# Patient Record
Sex: FEMALE | Race: BLACK OR AFRICAN AMERICAN
[De-identification: names, ages, dates, MRNs, and addresses within clinical notes are randomized per-mention and may not be internally consistent; named-entity substitution may affect disease eponyms.]

---

## 2020-07-04 ENCOUNTER — HOSPITAL ENCOUNTER (EMERGENCY)
Dept: HOSPITAL 61 - ER | Age: 35
Discharge: HOME | End: 2020-07-04
Payer: MEDICARE

## 2020-07-04 VITALS — WEIGHT: 114.64 LBS | HEIGHT: 64 IN | BODY MASS INDEX: 19.57 KG/M2

## 2020-07-04 VITALS — SYSTOLIC BLOOD PRESSURE: 97 MMHG | DIASTOLIC BLOOD PRESSURE: 53 MMHG

## 2020-07-04 DIAGNOSIS — D57.00: Primary | ICD-10-CM

## 2020-07-04 DIAGNOSIS — M79.605: ICD-10-CM

## 2020-07-04 DIAGNOSIS — M79.604: ICD-10-CM

## 2020-07-04 DIAGNOSIS — R07.89: ICD-10-CM

## 2020-07-04 LAB
ALBUMIN SERPL-MCNC: 4.1 G/DL (ref 3.4–5)
ALBUMIN/GLOB SERPL: 1.2 {RATIO} (ref 1–1.7)
ALP SERPL-CCNC: 143 U/L (ref 46–116)
ALT SERPL-CCNC: 50 U/L (ref 14–59)
ANION GAP SERPL CALC-SCNC: 11 MMOL/L (ref 6–14)
ANISOCYTOSIS BLD QL SMEAR: (no result)
AST SERPL-CCNC: 61 U/L (ref 15–37)
BASOPHILS # BLD AUTO: 0.2 X10^3/UL (ref 0–0.2)
BASOPHILS NFR BLD: 2 % (ref 0–3)
BILIRUB SERPL-MCNC: 1 MG/DL (ref 0.2–1)
BUN SERPL-MCNC: 13 MG/DL (ref 7–20)
BUN/CREAT SERPL: 14 (ref 6–20)
CALCIUM SERPL-MCNC: 8.3 MG/DL (ref 8.5–10.1)
CHLORIDE SERPL-SCNC: 106 MMOL/L (ref 98–107)
CO2 SERPL-SCNC: 21 MMOL/L (ref 21–32)
CREAT SERPL-MCNC: 0.9 MG/DL (ref 0.6–1)
EOSINOPHIL NFR BLD: 0.3 X10^3/UL (ref 0–0.7)
EOSINOPHIL NFR BLD: 4 % (ref 0–3)
ERYTHROCYTE [DISTWIDTH] IN BLOOD BY AUTOMATED COUNT: 20.8 % (ref 11.5–14.5)
GFR SERPLBLD BASED ON 1.73 SQ M-ARVRAT: 86.2 ML/MIN
GLOBULIN SER-MCNC: 3.5 G/DL (ref 2.2–3.8)
GLUCOSE SERPL-MCNC: 99 MG/DL (ref 70–99)
HCT VFR BLD CALC: 24.2 % (ref 36–47)
HGB BLD-MCNC: 8.5 G/DL (ref 12–15.5)
HGB S BLD QL SOLY: (no result)
HOWELL-JOLLY BOD BLD QL SMEAR: PRESENT
LDH SERPL L TO P-CCNC: 583 U/L (ref 81–234)
LIPASE: 109 U/L (ref 73–393)
LYMPHOCYTES # BLD: 1.4 X10^3/UL (ref 1–4.8)
LYMPHOCYTES NFR BLD AUTO: 17 % (ref 24–48)
MCH RBC QN AUTO: 37 PG (ref 25–35)
MCHC RBC AUTO-ENTMCNC: 35 G/DL (ref 31–37)
MCV RBC AUTO: 104 FL (ref 79–100)
MONO #: 1.2 X10^3/UL (ref 0–1.1)
MONOCYTES NFR BLD: 15 % (ref 0–9)
NEUT #: 5 X10^3/UL (ref 1.8–7.7)
NEUTROPHILS NFR BLD AUTO: 62 % (ref 31–73)
PAPPENHEIMER BODIES: PRESENT
PLATELET # BLD AUTO: 517 X10^3/UL (ref 140–400)
PLATELET # BLD EST: (no result) 10*3/UL
POLYCHROMASIA BLD QL SMEAR: SLIGHT
POTASSIUM SERPL-SCNC: 4.1 MMOL/L (ref 3.5–5.1)
PROT SERPL-MCNC: 7.6 G/DL (ref 6.4–8.2)
PROTHROMBIN TIME: 14.4 SEC (ref 11.7–14)
RBC # BLD AUTO: 2.33 X10^6/UL (ref 3.5–5.4)
SODIUM SERPL-SCNC: 138 MMOL/L (ref 136–145)
TARGETS BLD QL SMEAR: (no result)
WBC # BLD AUTO: 8.1 X10^3/UL (ref 4–11)

## 2020-07-04 PROCEDURE — 85045 AUTOMATED RETICULOCYTE COUNT: CPT

## 2020-07-04 PROCEDURE — 83690 ASSAY OF LIPASE: CPT

## 2020-07-04 PROCEDURE — 96374 THER/PROPH/DIAG INJ IV PUSH: CPT

## 2020-07-04 PROCEDURE — 71046 X-RAY EXAM CHEST 2 VIEWS: CPT

## 2020-07-04 PROCEDURE — 83605 ASSAY OF LACTIC ACID: CPT

## 2020-07-04 PROCEDURE — 96375 TX/PRO/DX INJ NEW DRUG ADDON: CPT

## 2020-07-04 PROCEDURE — 99285 EMERGENCY DEPT VISIT HI MDM: CPT

## 2020-07-04 PROCEDURE — 80053 COMPREHEN METABOLIC PANEL: CPT

## 2020-07-04 PROCEDURE — 36415 COLL VENOUS BLD VENIPUNCTURE: CPT

## 2020-07-04 PROCEDURE — 85025 COMPLETE CBC W/AUTO DIFF WBC: CPT

## 2020-07-04 PROCEDURE — 85610 PROTHROMBIN TIME: CPT

## 2020-07-04 PROCEDURE — 83615 LACTATE (LD) (LDH) ENZYME: CPT

## 2020-07-04 PROCEDURE — 96376 TX/PRO/DX INJ SAME DRUG ADON: CPT

## 2020-07-04 NOTE — RAD
Exam: Chest 2 views

 

INDICATION: Chest pain

 

TECHNIQUE: Frontal and lateral views the chest

 

Comparisons: None

 

FINDINGS:

The cardiomediastinal silhouette and pulmonary vessels are within normal 

limits.

 

The lung and pleural spaces are clear.

 

IMPRESSION:

No acute cardiopulmonary process.

 

Electronically signed by: Tracey Lind MD (7/4/2020 10:25 PM) [id number]

## 2020-07-04 NOTE — PHYS DOC
Past Medical History


Past Medical History:  Sickle Cell Disease


Additional Past Medical Histor:  stroke x 2


Past Surgical History:  Hip Replacement


Additional Past Surgical Histo:  brain shunt, hip replacement x 2


Smoking Status:  Never Smoker


Alcohol Use:  None





General Adult


EDM:


Chief Complaint:  PAIN CONTROL





HPI:


HPI:





Patient is a 35  year old female who presents to the ED with a chief complaint 

of sickle cell pain.  Patient states that the pain started earlier today.  

Patient states that the pain is in her chest, bilateral lower extremities.  

Patient states that she takes hydrocodone for her pain and is seen at the sickle

cell clinic in [place].  Patient denies fever, chills, nausea, vomiting, 

diarrhea, shortness of breath.  Patient denies exposure to anyone with c

oronavirus.





Review of Systems:


Review of Systems:


Constitutional:   Denies fever or chills. []


Eyes:   Denies change in visual acuity. []


HENT:   Denies nasal congestion or sore throat. [] 


Respiratory:   Complains of chest pain [] 


Cardiovascular:   Denies chest pain or edema. [] 


GI:   Denies abdominal pain, nausea, vomiting, bloody stools or diarrhea. [] 


:  Denies dysuria. [] 


Musculoskeletal:   Complains of bilateral lower extremity pain


Neurologic:   Denies headache, focal weakness or sensory changes. []





Heart Score:


Risk Factors:


Risk Factors:  DM, Current or recent (<one month) smoker, HTN, HLP, family 

history of CAD, obesity.


Risk Scores:


Score 0 - 3:  2.5% MACE over next 6 weeks - Discharge Home


Score 4 - 6:  20.3% MACE over next 6 weeks - Admit for Clinical Observation


Score 7 - 10:  72.7% MACE over next 6 weeks - Early Invasive Strategies





Current Medications:





Current Medications








 Medications


  (Trade)  Dose


 Ordered  Sig/Caren  Start Time


 Stop Time Status Last Admin


Dose Admin


 


 Diphenhydramine


 HCl


  (Benadryl)  25 mg  1X  ONCE  7/4/20 21:00


 7/4/20 21:01 DC 7/4/20 20:54


25 MG


 


 Morphine Sulfate


  (Morphine


 Sulfate)  4 mg  1X  ONCE  7/4/20 22:00


 7/4/20 22:01 DC 7/4/20 21:43


4 MG


 


 Ondansetron HCl


  (Zofran)  4 mg  1X  ONCE  7/4/20 20:30


 7/4/20 20:35 DC 7/4/20 20:42


4 MG


 


 Sodium Chloride  1,000 ml @ 


 1,000 mls/hr  1X  ONCE  7/4/20 20:30


 7/4/20 21:29 DC 7/4/20 20:42


1,000 MLS/HR











Allergies:


Allergies:





Allergies








Coded Allergies Type Severity Reaction Last Updated Verified


 


  No Known Drug Allergies    7/4/20 No











Physical Exam:


PE:





Constitutional: Well developed, well nourished, no acute distress, non-toxic 

appearance. []


HENT: Normocephalic, atraumatic


Eyes: EOMI


Neck: Normal range of motion, Supple


Cardiovascular:Heart rate regular rhythm


Lungs & Thorax:  Bilateral breath sounds clear to auscultation []


Abdomen: Bowel sounds normal, soft, no tenderness


Extremities: No tenderness, ROM intact


Neurologic: Alert and oriented X 3





Current Patient Data:


Labs:





                                Laboratory Tests








Test


 7/4/20


20:17 7/4/20


21:10


 


White Blood Count


 8.1 x10^3/uL


(4.0-11.0) 





 


Red Blood Count


 2.34 x10^6/uL


(3.50-5.70)  L 





 


Hemoglobin


 8.5 g/dL


(12.0-15.5)  L 





 


Hematocrit


 24.2 %


(36.0-47.0)  L 





 


Mean Corpuscular Volume


 104 fL


()  H 





 


Mean Corpuscular Hemoglobin


 37 pg (25-35)


H 





 


Mean Corpuscular Hemoglobin


Concent 35 g/dL


(31-37) 





 


Red Cell Distribution Width


 20.8 %


(11.5-14.5)  H 





 


Platelet Count


 517 x10^3/uL


(140-400)  H 





 


Neutrophils (%) (Auto) 62 % (31-73)   


 


Lymphocytes (%) (Auto) 17 % (24-48)  L 


 


Monocytes (%) (Auto) 15 % (0-9)  H 


 


Eosinophils (%) (Auto) 4 % (0-3)  H 


 


Basophils (%) (Auto) 2 % (0-3)   


 


Neutrophils # (Auto)


 5.0 x10^3/uL


(1.8-7.7) 





 


Lymphocytes # (Auto)


 1.4 x10^3/uL


(1.0-4.8) 





 


Monocytes # (Auto)


 1.2 x10^3/uL


(0.0-1.1)  H 





 


Eosinophils # (Auto)


 0.3 x10^3/uL


(0.0-0.7) 





 


Basophils # (Auto)


 0.2 x10^3/uL


(0.0-0.2) 





 


Platelet Estimate


 Increased


(ADEQUATE) 





 


Polychromasia Slight   


 


Anisocytosis Mod   


 


Pappenheimer Bodies Present   


 


Sickle Cells Few   


 


Target Cells Few   


 


Mendoza-Jolly Bodies Present   


 


Absolute Reticulocyte Count


 0.078 x10^6/uL


(0.020-0.120) 





 


Percent Reticulocyte Count


 3.3 %


(0.5-2.3)  H 





 


Immature Reticulocyte Fraction


 0.71


(0.20-0.60)  H 





 


Prothrombin Time


 14.4 SEC


(11.7-14.0)  H 





 


Prothrombin Time INR


 1.2 (0.8-1.1)


H 





 


Sodium Level


 


 138 mmol/L


(136-145)


 


Potassium Level


 


 4.1 mmol/L


(3.5-5.1)


 


Chloride Level


 


 106 mmol/L


()


 


Carbon Dioxide Level


 


 21 mmol/L


(21-32)


 


Anion Gap  11 (6-14)  


 


Blood Urea Nitrogen


 


 13 mg/dL


(7-20)


 


Creatinine


 


 0.9 mg/dL


(0.6-1.0)


 


Estimated GFR


(Cockcroft-Gault) 


 86.2  





 


BUN/Creatinine Ratio  14 (6-20)  


 


Glucose Level


 


 99 mg/dL


(70-99)


 


Lactic Acid Level


 


 0.9 mmol/L


(0.4-2.0)


 


Calcium Level


 


 8.3 mg/dL


(8.5-10.1)  L


 


Total Bilirubin


 


 1.0 mg/dL


(0.2-1.0)


 


Aspartate Amino Transferase


(AST) 


 61 U/L (15-37)


H


 


Alanine Aminotransferase (ALT)


 


 50 U/L (14-59)





 


Alkaline Phosphatase


 


 143 U/L


()  H


 


Lactate Dehydrogenase


 


 583 U/L


()  H


 


Total Protein


 


 7.6 g/dL


(6.4-8.2)


 


Albumin


 


 4.1 g/dL


(3.4-5.0)


 


Albumin/Globulin Ratio  1.2 (1.0-1.7)  


 


Lipase


 


 109 U/L


()





                                Laboratory Tests


7/4/20 20:17








                                Laboratory Tests


7/4/20 21:10








Vital Signs:





                                   Vital Signs








  Date Time  Temp Pulse Resp B/P (MAP) Pulse Ox O2 Delivery O2 Flow Rate FiO2


 


7/4/20 21:43   16  99 Room Air  


 


7/4/20 21:30  88      


 


7/4/20 19:50 98.7   108/67 (81)    





 98.7       











EKG:


EKG:


[]





Radiology/Procedures:


Radiology/Procedures:


[]


Impression:


CXR 


IMPRESSION:


No acute cardiopulmonary process.





Course & Med Decision Making:


Course & Med Decision Making


Pertinent Labs and Imaging studies reviewed. (See chart for details)





Labs show that patient has chronic anemia.





Chest x-ray does not show any acute injury or thoracic process.





Reticulocyte count is not elevated.





Patient is received 2 doses of morphine 4 mg IV.





On recheck patient states that her pain is controlled.





Patient refused EKG.


Patient also does not want troponin to be drawn.





Patient was to be discharged home for outpatient follow-up.





Discussed  results and plan of care with patient.


Patient is instructed to follow up with PCP in one to 2 days.


Appropriate discharge instructions given to patient to return to the ED or to 

seek immediate medical evaluation.


Patient is instructed to return to the ED if symptoms worsen or if any concerns.





Dragon Disclaimer:


Dragon Disclaimer:


This electronic medical record was generated, in whole or in part, using a voice

 recognition dictation system.





Departure


Departure


Impression:  


   Primary Impression:  


   Sickle cell anemia with pain


Disposition:  01 HOME, SELF-CARE


Condition:  STABLE


Referrals:  


NO PCP (PCP)


Patient Instructions:  Sickle Cell Anemia, Sickle Cell Pain Crisis





Additional Instructions:  


Discussed  results and plan of care with patient.


Patient is instructed to follow up with PCP in one to 2 days.


Appropriate discharge instructions given to patient to return to the ED or to 

seek immediate medical evaluation.


Patient is instructed to return to the ED if symptoms worsen or if any concerns.





Justicifation of Admission Dx:


Justifications for Admission:


Justification of Admission Dx:  LETITIA Foreman DO            Jul 4, 2020 23:04

## 2020-07-05 ENCOUNTER — HOSPITAL ENCOUNTER (EMERGENCY)
Dept: HOSPITAL 61 - ER | Age: 35
Discharge: HOME | End: 2020-07-05
Payer: MEDICARE

## 2020-07-05 VITALS — WEIGHT: 121.25 LBS | BODY MASS INDEX: 20.7 KG/M2 | HEIGHT: 64 IN

## 2020-07-05 VITALS — SYSTOLIC BLOOD PRESSURE: 98 MMHG | DIASTOLIC BLOOD PRESSURE: 53 MMHG

## 2020-07-05 DIAGNOSIS — M25.552: ICD-10-CM

## 2020-07-05 DIAGNOSIS — D57.1: Primary | ICD-10-CM

## 2020-07-05 DIAGNOSIS — M25.551: ICD-10-CM

## 2020-07-05 DIAGNOSIS — R07.89: ICD-10-CM

## 2020-07-05 LAB
ANISOCYTOSIS BLD QL SMEAR: PRESENT
BASOPHILS # BLD AUTO: 0.1 X10^3/UL (ref 0–0.2)
BASOPHILS NFR BLD: 2 % (ref 0–3)
EOSINOPHIL NFR BLD: 0.2 X10^3/UL (ref 0–0.7)
EOSINOPHIL NFR BLD: 3 % (ref 0–3)
ERYTHROCYTE [DISTWIDTH] IN BLOOD BY AUTOMATED COUNT: 20.7 % (ref 11.5–14.5)
HCT VFR BLD CALC: 22.9 % (ref 36–47)
HGB BLD-MCNC: 7.9 G/DL (ref 12–15.5)
HGB S BLD QL SOLY: PRESENT
HOWELL-JOLLY BOD BLD QL SMEAR: PRESENT
HYPOCHROMIA BLD QL SMEAR: SLIGHT
LYMPHOCYTES # BLD: 1.3 X10^3/UL (ref 1–4.8)
LYMPHOCYTES NFR BLD AUTO: 18 % (ref 24–48)
MACROCYTES BLD QL SMEAR: PRESENT
MCH RBC QN AUTO: 36 PG (ref 25–35)
MCHC RBC AUTO-ENTMCNC: 34 G/DL (ref 31–37)
MCV RBC AUTO: 105 FL (ref 79–100)
MONO #: 0.9 X10^3/UL (ref 0–1.1)
MONOCYTES NFR BLD: 13 % (ref 0–9)
NEUT #: 4.7 X10^3/UL (ref 1.8–7.7)
NEUTROPHILS NFR BLD AUTO: 64 % (ref 31–73)
PAPPENHEIMER BODIES: PRESENT
PLATELET # BLD AUTO: 459 X10^3/UL (ref 140–400)
PLATELET # BLD EST: (no result) 10*3/UL
POIKILOCYTOSIS BLD QL SMEAR: PRESENT
RBC # BLD AUTO: 2.19 X10^6/UL (ref 3.5–5.4)
TARGETS BLD QL SMEAR: PRESENT
WBC # BLD AUTO: 7.3 X10^3/UL (ref 4–11)

## 2020-07-05 PROCEDURE — 85045 AUTOMATED RETICULOCYTE COUNT: CPT

## 2020-07-05 PROCEDURE — 36415 COLL VENOUS BLD VENIPUNCTURE: CPT

## 2020-07-05 PROCEDURE — 93005 ELECTROCARDIOGRAM TRACING: CPT

## 2020-07-05 PROCEDURE — 85025 COMPLETE CBC W/AUTO DIFF WBC: CPT

## 2020-07-05 PROCEDURE — 96376 TX/PRO/DX INJ SAME DRUG ADON: CPT

## 2020-07-05 PROCEDURE — 96374 THER/PROPH/DIAG INJ IV PUSH: CPT

## 2020-07-05 PROCEDURE — 99284 EMERGENCY DEPT VISIT MOD MDM: CPT

## 2020-07-05 NOTE — PHYS DOC
Past Medical History


Past Medical History:  Sickle Cell Disease


Additional Past Medical Histor:  stroke x 2


Past Surgical History:  Hip Replacement


Additional Past Surgical Histo:  brain shunt, hip replacement x 2


Smoking Status:  Never Smoker


Alcohol Use:  Occasionally





General Adult


EDM:


Chief Complaint:  PAIN CONTROL





HPI:


HPI:





Patient is a 35  year old female who presents with sickle cell pain crisis after

the last couple of days.  Patient was seen in the emergency room last night for 

the same pain.  Pain improved after treatment in the emergency room.  She sat in

the waiting room for several hours waiting for a cab but never came.  She states

that because of her sickle cell pain she gets very stiff and painful when she 

has to sit for long periods of time and gets cold.  She has been taking her 

medications as prescribed.  She denies any new symptoms.  She did turn down an 

EKG last night but states that she is happy to get one today.





Review of Systems:


Review of Systems:


General: Denies fever, chills, sweats, fatigue


Eyes: Denies drainage, blurred vision, eye redness


HENT: Denies rhinorrhea, sore throat, earache


Respiratory: Denies cough, shortness of breath, wheezing


Cardiac: Denies edema, palpitations.  Reports chest pain


GI: Denies abdominal pain, Nausea, vomiting


MSK: Denies back pain, neck pain reports hip pain


Skin: Denies rash, jaundice


Neuro: Denies headache, dizziness


Psychiatric: Denies SI/HI





Heart Score:


Risk Factors:


Risk Factors:  DM, Current or recent (<one month) smoker, HTN, HLP, family 

history of CAD, obesity.


Risk Scores:


Score 0 - 3:  2.5% MACE over next 6 weeks - Discharge Home


Score 4 - 6:  20.3% MACE over next 6 weeks - Admit for Clinical Observation


Score 7 - 10:  72.7% MACE over next 6 weeks - Early Invasive Strategies





Current Medications:





Current Medications








 Medications


  (Trade)  Dose


 Ordered  Sig/Caren  Start Time


 Stop Time Status Last Admin


Dose Admin


 


 Morphine Sulfate


  (Morphine


 Sulfate)  5 mg  1X  ONCE  7/5/20 08:45


 7/5/20 08:46 DC  





 


 Sodium Chloride  1,000 ml @ 


 40 mls/hr  1X  ONCE  7/5/20 08:30


 7/6/20 09:29   














Allergies:


Allergies:





Allergies








Coded Allergies Type Severity Reaction Last Updated Verified


 


  No Known Drug Allergies    7/4/20 No











Physical Exam:


PE:


General: Awake, alert, NAD. Well Nourished, well hydrated. Cooperative


HEENT: Atraumatic, EOMI, PERRL, airway patent, moist oral mucosa


Neck: Supple, trachea midline


Respiratory: CTA bilaterally, normal effort, no wheezing/crackles


CV: RRR, no murmur, cap refill <2


GI: Soft, nondistended, nontender, no masses


MSK: No obvious deformities


Skin: Warm, dry, intact


Neuro: A&O x3, speech NL, sensory and motor grossly intact, no focal deficits


Psych: Normal affect, normal mood, not suicidal or homicidal





Current Patient Data:


Vital Signs:





                                   Vital Signs








  Date Time  Temp Pulse Resp B/P (MAP) Pulse Ox O2 Delivery O2 Flow Rate FiO2


 


7/5/20 08:07 97.5 91 18 115/56 (75) 98 Room Air  





 97.5       











EKG:


EKG:


[]





Radiology/Procedures:


Radiology/Procedures:


[]





Course & Med Decision Making:


Course & Med Decision Making


Pertinent Labs and Imaging studies reviewed. (See chart for details)





Patient is a 35-year-old female with a past medical history of sickle cell who 

presents to the Emergency Room complaining of sickle cell pain consistent with a

 sickle cell crisis. On exam, patient is well-appearing. Patient is having pain 

in her hips and chest. Patient does have chest pain and will need a chest x-ray 

and EKG. Patient does not have priapism, focal neurologic deficits, fever, 

hypoxia, hypotension. CBC, retic count, CMP, UA were ordered to evaluate for 

aplastic crisis and precipitating factors of a crisis including infection, 

acidosis, dehydration. Patient was given morphine and fluids for symptoms upon 

arrival. Patient has had 2 emergency Room visits for sickle cell crisis in the 

last year. Work up was reviewed and reveals stable labs.  Hemoglobin is slightly

 lower but this is likely due to the fluid she received last night and this 

morning. On reevaluation, pain is better.  Patient is requesting IV Benadryl and

 multiple doses of pain medication.  I have discussed with her that I will give 

her oral Benadryl and 1 more dose of pain medicine.  Patient does not appear to 

be in any acute distress.  Patient's test results and vitals while in the ED 

were fully reviewed and discussed with the patient. Patient is stable and at 

this time does not need admission to the hospital. We have discussed strict 

return precautions and the importance of following up with their Primary Care 

Physician. Patient stated understanding and was given an opportunity to ask any 

questions. Patient is in agreement with plan.





Dragon Disclaimer:


Dragon Disclaimer:


This electronic medical record was generated, in whole or in part, using a voice

 recognition dictation system.





Departure


Departure


Impression:  


   Primary Impression:  


   Sickle cell anemia


Disposition:  01 HOME, SELF-CARE


Condition:  GOOD


Referrals:  


NO PCP (PCP)


Patient Instructions:  Sickle Cell Pain Crisis





Justicifation of Admission Dx:


Justifications for Admission:


Justification of Admission Dx:  No











GERRY WAGONER MD               Jul 5, 2020 09:10

## 2020-07-07 NOTE — EKG
[facility]

              [address] [place] [address]

Test Date:    2020               Test Time:    08:34:29

Pat Name:     RA GONSALES            Department:   

Patient ID:   [id number]           Room:          

Gender:       F                        Technician:   

:          [id number]               Requested By: GERRY WAGONER

Order Number: [id number]           Reading MD:     

                                 Measurements

Intervals                              Axis          

Rate:         78                       P:            58

TN:           122                      QRS:          55

QRSD:         86                       T:            55

QT:           408                                    

QTc:          469                                    

                           Interpretive Statements

SINUS RHYTHM

QRS(T) CONTOUR ABNORMALITY

CONSIDER ANTEROLATERAL MYOCARDIAL DAMAGE

T ABNORMALITY IN ANTEROSEPTAL LEADS

ABNORMAL ECG

RI6.01

No previous ECG available for comparison

## 2020-07-25 ENCOUNTER — HOSPITAL ENCOUNTER (OUTPATIENT)
Dept: HOSPITAL 61 - ER | Age: 35
Setting detail: OBSERVATION
LOS: 1 days | Discharge: HOME | End: 2020-07-26
Attending: INTERNAL MEDICINE | Admitting: INTERNAL MEDICINE
Payer: MEDICARE

## 2020-07-25 VITALS — WEIGHT: 124.34 LBS | BODY MASS INDEX: 21.23 KG/M2 | HEIGHT: 64 IN

## 2020-07-25 VITALS — DIASTOLIC BLOOD PRESSURE: 61 MMHG | SYSTOLIC BLOOD PRESSURE: 109 MMHG

## 2020-07-25 VITALS — SYSTOLIC BLOOD PRESSURE: 114 MMHG | DIASTOLIC BLOOD PRESSURE: 66 MMHG

## 2020-07-25 DIAGNOSIS — D57.1: Primary | ICD-10-CM

## 2020-07-25 DIAGNOSIS — G89.4: ICD-10-CM

## 2020-07-25 DIAGNOSIS — Z96.649: ICD-10-CM

## 2020-07-25 DIAGNOSIS — Z86.73: ICD-10-CM

## 2020-07-25 DIAGNOSIS — Z79.891: ICD-10-CM

## 2020-07-25 LAB
ALBUMIN SERPL-MCNC: 3.8 G/DL (ref 3.4–5)
ALBUMIN/GLOB SERPL: 1.1 {RATIO} (ref 1–1.7)
ALP SERPL-CCNC: 115 U/L (ref 46–116)
ALT SERPL-CCNC: 26 U/L (ref 14–59)
ANION GAP SERPL CALC-SCNC: 10 MMOL/L (ref 6–14)
ANISOCYTOSIS BLD QL SMEAR: (no result)
AST SERPL-CCNC: 29 U/L (ref 15–37)
BASOPHILS # BLD AUTO: 0.1 X10^3/UL (ref 0–0.2)
BASOPHILS NFR BLD: 1 % (ref 0–3)
BILIRUB SERPL-MCNC: 0.6 MG/DL (ref 0.2–1)
BUN SERPL-MCNC: 7 MG/DL (ref 7–20)
BUN/CREAT SERPL: 9 (ref 6–20)
CALCIUM SERPL-MCNC: 8.6 MG/DL (ref 8.5–10.1)
CHLORIDE SERPL-SCNC: 106 MMOL/L (ref 98–107)
CO2 SERPL-SCNC: 25 MMOL/L (ref 21–32)
CREAT SERPL-MCNC: 0.8 MG/DL (ref 0.6–1)
EOSINOPHIL NFR BLD: 0.2 X10^3/UL (ref 0–0.7)
EOSINOPHIL NFR BLD: 3 % (ref 0–3)
ERYTHROCYTE [DISTWIDTH] IN BLOOD BY AUTOMATED COUNT: 26.7 % (ref 11.5–14.5)
GFR SERPLBLD BASED ON 1.73 SQ M-ARVRAT: 98.8 ML/MIN
GLOBULIN SER-MCNC: 3.5 G/DL (ref 2.2–3.8)
GLUCOSE SERPL-MCNC: 94 MG/DL (ref 70–99)
HCT VFR BLD CALC: 20.5 % (ref 36–47)
HGB BLD-MCNC: 7.3 G/DL (ref 12–15.5)
HGB S BLD QL SOLY: (no result)
LYMPHOCYTES # BLD: 1.9 X10^3/UL (ref 1–4.8)
LYMPHOCYTES NFR BLD AUTO: 24 % (ref 24–48)
MACROCYTES BLD QL SMEAR: SLIGHT
MCH RBC QN AUTO: 39 PG (ref 25–35)
MCHC RBC AUTO-ENTMCNC: 36 G/DL (ref 31–37)
MCV RBC AUTO: 109 FL (ref 79–100)
MICROCYTES BLD QL SMEAR: SLIGHT
MONO #: 1 X10^3/UL (ref 0–1.1)
MONOCYTES NFR BLD: 13 % (ref 0–9)
NEUT #: 4.6 X10^3/UL (ref 1.8–7.7)
NEUTROPHILS NFR BLD AUTO: 58 % (ref 31–73)
PLATELET # BLD AUTO: 393 X10^3/UL (ref 140–400)
PLATELET # BLD EST: ADEQUATE 10*3/UL
POIKILOCYTOSIS BLD QL SMEAR: (no result)
POLYCHROMASIA BLD QL SMEAR: (no result)
POTASSIUM SERPL-SCNC: 3.8 MMOL/L (ref 3.5–5.1)
PROT SERPL-MCNC: 7.3 G/DL (ref 6.4–8.2)
RBC # BLD AUTO: 1.88 X10^6/UL (ref 3.5–5.4)
SCHISTOCYTES BLD QL SMEAR: (no result)
SODIUM SERPL-SCNC: 141 MMOL/L (ref 136–145)
TARGETS BLD QL SMEAR: (no result)
WBC # BLD AUTO: 7.9 X10^3/UL (ref 4–11)

## 2020-07-25 PROCEDURE — 85045 AUTOMATED RETICULOCYTE COUNT: CPT

## 2020-07-25 PROCEDURE — G0378 HOSPITAL OBSERVATION PER HR: HCPCS

## 2020-07-25 PROCEDURE — 85025 COMPLETE CBC W/AUTO DIFF WBC: CPT

## 2020-07-25 PROCEDURE — 96372 THER/PROPH/DIAG INJ SC/IM: CPT

## 2020-07-25 PROCEDURE — 99284 EMERGENCY DEPT VISIT MOD MDM: CPT

## 2020-07-25 PROCEDURE — 80048 BASIC METABOLIC PNL TOTAL CA: CPT

## 2020-07-25 PROCEDURE — 36415 COLL VENOUS BLD VENIPUNCTURE: CPT

## 2020-07-25 PROCEDURE — G0379 DIRECT REFER HOSPITAL OBSERV: HCPCS

## 2020-07-25 PROCEDURE — 96374 THER/PROPH/DIAG INJ IV PUSH: CPT

## 2020-07-25 PROCEDURE — 96376 TX/PRO/DX INJ SAME DRUG ADON: CPT

## 2020-07-25 PROCEDURE — 80053 COMPREHEN METABOLIC PANEL: CPT

## 2020-07-25 PROCEDURE — 81025 URINE PREGNANCY TEST: CPT

## 2020-07-25 PROCEDURE — 96375 TX/PRO/DX INJ NEW DRUG ADDON: CPT

## 2020-07-25 RX ADMIN — MORPHINE SULFATE PRN MG: 4 INJECTION, SOLUTION INTRAMUSCULAR; INTRAVENOUS at 19:27

## 2020-07-25 RX ADMIN — MORPHINE SULFATE PRN MG: 4 INJECTION, SOLUTION INTRAMUSCULAR; INTRAVENOUS at 21:25

## 2020-07-25 NOTE — PHYS DOC
Past Medical History


Past Medical History:  Sickle Cell Disease


Additional Past Medical Histor:  stroke x 2


Past Surgical History:  Hip Replacement


Additional Past Surgical Histo:  brain shunt, hip replacement x 2


Smoking Status:  Never Smoker


Alcohol Use:  Occasionally





General Adult


EDM:


Chief Complaint:  PAIN CONTROL





HPI:


HPI:





Patient is a 35  year old female who presents with generalized body aches and 

pains that she associates with her normal sickle cell pains, patient states that

she has not taken anything for pain today, she rates her pain a 10/10 pain 

scale.  Patient reports she woke up with her pain this morning noting that it 

was mostly in her thighs and low back, patient states this is where her sickle 

cell pain usually manifests from.  Patient denies any fever or chills, visual 

changes, nasal congestion, cough or shortness of breath.  She denies any chest 

pain, or peripheral edema.  Patient denies any abdominal pain, nausea, vomiting,

diarrhea, constipation.  Patient denies any problems urinating, patient denies 

any pain in her joints.  The skin rashes, headaches, focal weaknesses or sensory

changes.  Patient denies any swelling of her glands, denies any recent life 

changes depressions or anxieties, patient denies any homicidal suicidal 

ideations.  Patient states that she gets some pain control she should be okay.





Review of Systems:


Review of Systems:


Constitutional:   Denies fever or chills.  Patient denies exposure to COVID-19.


Eyes:   Denies change in visual acuity. 


HENT:   Denies nasal congestion or sore throat.  


Respiratory:   Denies cough or shortness of breath.  


Cardiovascular:   Denies chest pain or edema.  


GI:   Denies abdominal pain, nausea, vomiting, bloody stools or diarrhea.  


:  Denies dysuria.  


Musculoskeletal:   Patient denies pain in her joints, however does complain of 

generalized body aches that she associates with her sickle cell pain patient 

states is mostly in her low back and thighs.


Integument:   Denies rash.  


Neurologic:   Denies headache, focal weakness or sensory changes.  


Lymphatic:  Denies swollen glands.  


Psychiatric:  Denies depression or anxiety.  Patient denies HI SI.





Heart Score:


Risk Factors:


Risk Factors:  DM, Current or recent (<one month) smoker, HTN, HLP, family 

history of CAD, obesity.


Risk Scores:


Score 0 - 3:  2.5% MACE over next 6 weeks - Discharge Home


Score 4 - 6:  20.3% MACE over next 6 weeks - Admit for Clinical Observation


Score 7 - 10:  72.7% MACE over next 6 weeks - Early Invasive Strategies





Family History:


Family History:


Patient denies any family history significant to this visit.





Current Medications:





Current Medications








 Medications


  (Trade)  Dose


 Ordered  Sig/Caren  Start Time


 Stop Time Status Last Admin


Dose Admin


 


 Diphenhydramine


 HCl


  (Benadryl)  25 mg  1X  ONCE  7/25/20 12:45


 7/25/20 12:46 DC 7/25/20 12:49


25 MG


 


 Morphine Sulfate


  (Morphine


 Sulfate)  4 mg  1X  ONCE  7/25/20 12:45


 7/25/20 12:46 DC 7/25/20 12:49


4 MG


 


 Ondansetron HCl


  (Zofran Odt)  4 mg  1X  ONCE  7/25/20 12:45


 7/25/20 12:46 DC 7/25/20 12:48


4 MG


 


 Ondansetron HCl


  (Zofran)  4 mg  1X  ONCE  7/25/20 12:15


 7/25/20 12:42 DC  














Allergies:


Allergies:





Allergies








Coded Allergies Type Severity Reaction Last Updated Verified


 


  No Known Drug Allergies    7/4/20 No











Physical Exam:


PE:





Constitutional: Well developed, well nourished, no acute distress, non-toxic 

appearance.  Patient's complaints of pain are far out of proportion to physical 

findings.


HENT: Normocephalic, atraumatic, bilateral external ears normal, oropharynx 

moist, no oral exudates, nose normal. 


Eyes: PERRLA, EOMI, conjunctiva normal, no discharge.  Pupils 4 mm.


Neck: Normal range of motion, no tenderness, supple, no stridor.  


Cardiovascular:Heart rate regular rhythm, no murmur heart sounds S1-S2, no 

abnormalities per auscultation


Lungs & Thorax:  Bilateral breath sounds clear to auscultation all lung fields.


Abdomen: Bowel sounds normal all 4 quadrants, soft, no tenderness, no masses, no

pulsatile masses. 


Skin: Warm, dry, no erythema, no rash.  


Back: No tenderness, no CVA tenderness.  


Extremities: No tenderness to palpation, no cyanosis, no clubbing, ROM intact, 

patient does report pain when moving extremities, no edema.  


Neurologic: Alert and oriented X 3, normal motor function, normal sensory 

function, no focal deficits noted. 


Psychologic: Affect normal, judgement normal, mood normal.





Current Patient Data:


Labs:





                                Laboratory Tests








Test


 7/25/20


11:45 7/25/20


12:10


 


POC Urine HCG, Qualitative


 Hcg negative


(Negative) 





 


White Blood Count


 


 7.9 x10^3/uL


(4.0-11.0)


 


Red Blood Count


 


 1.88 x10^6/uL


(3.50-5.40)  L


 


Hemoglobin


 


 7.3 g/dL


(12.0-15.5)  L


 


Hematocrit


 


 20.5 %


(36.0-47.0)  *L


 


Mean Corpuscular Volume


 


 109 fL


()  H


 


Mean Corpuscular Hemoglobin


 


 39 pg (25-35)


H


 


Mean Corpuscular Hemoglobin


Concent 


 36 g/dL


(31-37)


 


Red Cell Distribution Width


 


 26.7 %


(11.5-14.5)  H


 


Platelet Count


 


 393 x10^3/uL


(140-400)


 


Neutrophils (%) (Auto)  58 % (31-73)  


 


Lymphocytes (%) (Auto)  24 % (24-48)  


 


Monocytes (%) (Auto)  13 % (0-9)  H


 


Eosinophils (%) (Auto)  3 % (0-3)  


 


Basophils (%) (Auto)  1 % (0-3)  


 


Neutrophils # (Auto)


 


 4.6 x10^3/uL


(1.8-7.7)


 


Lymphocytes # (Auto)


 


 1.9 x10^3/uL


(1.0-4.8)


 


Monocytes # (Auto)


 


 1.0 x10^3/uL


(0.0-1.1)


 


Eosinophils # (Auto)


 


 0.2 x10^3/uL


(0.0-0.7)


 


Basophils # (Auto)


 


 0.1 x10^3/uL


(0.0-0.2)


 


Platelet Estimate  Pending  


 


Sodium Level


 


 141 mmol/L


(136-145)


 


Potassium Level


 


 3.8 mmol/L


(3.5-5.1)


 


Chloride Level


 


 106 mmol/L


()


 


Carbon Dioxide Level


 


 25 mmol/L


(21-32)


 


Anion Gap  10 (6-14)  


 


Blood Urea Nitrogen


 


 7 mg/dL (7-20)





 


Creatinine


 


 0.8 mg/dL


(0.6-1.0)


 


Estimated GFR


(Cockcroft-Gault) 


 98.8  





 


BUN/Creatinine Ratio  9 (6-20)  


 


Glucose Level


 


 94 mg/dL


(70-99)


 


Calcium Level


 


 8.6 mg/dL


(8.5-10.1)


 


Total Bilirubin


 


 0.6 mg/dL


(0.2-1.0)


 


Aspartate Amino Transferase


(AST) 


 29 U/L (15-37)





 


Alanine Aminotransferase (ALT)


 


 26 U/L (14-59)





 


Alkaline Phosphatase


 


 115 U/L


()


 


Total Protein


 


 7.3 g/dL


(6.4-8.2)


 


Albumin


 


 3.8 g/dL


(3.4-5.0)


 


Albumin/Globulin Ratio  1.1 (1.0-1.7)  





                                Laboratory Tests


7/25/20 12:10








                                Laboratory Tests


7/25/20 12:10








Vital Signs:





                                   Vital Signs








  Date Time  Temp Pulse Resp B/P (MAP) Pulse Ox O2 Delivery O2 Flow Rate FiO2


 


7/25/20 12:49   16  97 Room Air  


 


7/25/20 11:15 98.2 91  129/67 (87)    





 98.2       











EKG:


EKG:


[]





Radiology/Procedures:


Radiology/Procedures:


[]





Course & Med Decision Making:


Course & Med Decision Making


Pertinent Labs and Imaging studies reviewed. (See chart for details)





35-year-old female with history of sickle cell disease presents to the ER with 

complaints of body aches and pains consistent with her sickle cell.  Patient is 

complaints of pain are far out of proportion to physical findings.  Patient 

initially stated that she wanted to be admitted to the hospital, stating that 

she used to be seen at [facility] and has recently changed her 

sickle cell care to [facility].  Patient was giving IM morphine and 

Benadryl for pain, patient states her pain about 10-10 did not decrease with tho

se medicines.  Upon reexamination of the patient and discussed with possible 

admit for pain control, patient stated that she is out of her 15 mg OxyContin's 

at home and states if she was given a prescription she would be able to go home 

and manage her pain, seeing her sickle cell physician at [place] on Monday.  

Discussed with patient the usage of the emergency department for pain control, 

and that patient should follow-up with her pain management/sickle cell physician

 for these types of problems.  Discharge plan to send home with 10 count 15 mg 

OxyContin's, have patient follow-up with her sickle cell physician at [facility] on Monday, patient was amenable to this discharge planning, had no 

further questions or concerns patient discharged home.  Upon completing 

discharge instructions, I was approached by ED nurse who said patient no longer 

wants to be discharged home and wanted speak to me.  Upon reexamination of 

patient, patient was agreeable to allow me to start her IV and give her fluids 

with the plan to discharge to home after a 1000 cc bolus of normal saline and 

additional 4 mg of morphine and an additional 25 mg of Benadryl were given IV.  

However, patient states that her pain remains a 10 out of 10 and she now wishes 

to be admitted to the hospital.  Discussed this case with [facility] Dr. Markham who 

was agreeable to admit patient under observation and pain control.





Dragon Disclaimer:


Dragon Disclaimer:


This electronic medical record was generated, in whole or in part, using a voice

 recognition dictation system.





Departure


Departure


Impression:  


   Primary Impression:  


   Sickle cell anemia with pain


   Additional Impression:  


   Prescription refill


Disposition:  01 HOME, SELF-CARE


Condition:  GOOD


Referrals:  


NO PCP (PCP)


Patient Instructions:  Sickle Cell Pain Crisis





Justicifation of Admission Dx:


Justifications for Admission:


Justification of Admission Dx:  Yes


Comments:


INTRACTABLE SICKLE CELL PAIN











MONICA STONE         Jul 25, 2020 13:25

## 2020-07-25 NOTE — PDOC1
History and Physical


Date of Admission


Date of Admission


7/25/2020





Identification/Chief Complaint


Chief Complaint


I am hurting all over





Source


Source:  Chart review, Patient





History of Present Illness


History of Present Illness


Patient is a 35-year-old female with past medical history of sickle cell anemia 

who has been in her usual state of health until today when she woke up and had 

excruciating pain that she describes as "all over".  The patient denies any 

recent infections no cold-like symptoms she denies chest pain no shortness of 

breath has been reported, no palpitations no nausea vomiting or diarrhea has 

been reported.  Of note is that the patient apparently is in the process of 

changing care from his hematologist from 1 institution to another the details 

are certainly obscure.  Patient is quite adamant about her pain medications and 

how she feels like her doctors are not understanding how to take care of her 

pain.  She seems to be in no acute distress she has gotten IV fluids in the [facility] department certainly does not look like an acute chest syndrome due to her

uncontrolled pain we have been asked to admit the patient for treatment.





She denies sick contacts no strokelike symptoms and no other alarming signs are 

evident at this time.  She is currently on Hydrea and also chelation therapy for

iron overload.





Past Medical History


Past Medical History


Sickle cell anemia


Chronic pain syndrome


Chronic narcotic use





Past Surgical History


Past Surgical History:  Total hip replacement





Family History


Family History:  No Significant, Diabetes





Social History


Smoke:  No


ALCOHOL:  none


Drugs:  None





Current Problem List


Problem List


Problems


Medical Problems:


(1) Prescription refill


Status: Acute  





(2) Sickle cell anemia with pain


Status: Acute  











Current Medications


Current Medications





Current Medications








 Medications


  (Trade)  Dose


 Ordered  Sig/Caren  Start Time


 Stop Time Status Last Admin


Dose Admin


 


 Acetaminophen


  (Tylenol)  650 mg  PRN Q4HRS  PRN  7/25/20 17:15


     





 


 Albuterol Sulfate


  (Ventolin Neb


 Soln)  2.5 mg  PRN Q4HRS  PRN  7/25/20 17:15


     





 


 Calamine


  (Calamine Lotion)  1 chucky  PRN Q4HRS  PRN  7/25/20 19:15


     





 


 Clonidine HCl


  (Catapres)  0.1 mg  PRN Q6HRS  PRN  7/25/20 17:15


     





 


 Diphenhydramine


 HCl


  (Benadryl Oral


 Elixir)  12.5 mg  1X  ONCE  7/25/20 19:15


 7/25/20 19:16 DC 7/25/20 19:26


12.5 MG


 


 Diphenhydramine


 HCl


  (Benadryl)  25 mg  1X  ONCE  7/25/20 14:45


 7/25/20 14:47 DC 7/25/20 14:59


25 MG


 


 Docusate Sodium


  (Colace)  100 mg  PRN BID  PRN  7/25/20 17:15


     





 


 Enoxaparin Sodium


  (Lovenox 40mg


 Syringe)  40 mg  Q24H  7/25/20 21:00


     





 


 Guaifenesin


  (Robitussin)  200 mg  PRN Q4HRS  PRN  7/25/20 17:15


     





 


 Hydroxyzine HCl


  (Atarax)  10 mg  PRN Q6HRS  PRN  7/25/20 19:15


     





 


 Morphine Sulfate


  (Morphine


 Sulfate)  4 mg  PRN Q2HR  PRN  7/25/20 17:15


 7/26/20 17:14  7/25/20 21:25


4 MG


 


 Ondansetron HCl


  (Zofran Odt)  4 mg  1X  ONCE  7/25/20 12:45


 7/25/20 12:46 DC 7/25/20 12:48


4 MG


 


 Ondansetron HCl


  (Zofran)  4 mg  PRN Q8HRS  PRN  7/25/20 17:15


 7/26/20 17:14   





 


 Ringer's Solution  1,000 ml @ 


 125 mls/hr  1X  ONCE  7/25/20 17:15


 7/26/20 01:14  7/25/20 17:15


125 MLS/HR


 


 Sodium Chloride  1,000 ml @ 


 1,000 mls/hr  1X  ONCE  7/25/20 14:45


 7/25/20 15:44 DC 7/25/20 14:59


1,000 MLS/HR


 


 Zolpidem Tartrate


  (Ambien)  5 mg  PRN QHS  PRN  7/25/20 21:00


    7/25/20 21:25


5 MG











Allergies


Allergies





Allergies








Coded Allergies Type Severity Reaction Last Updated Verified


 


  No Known Drug Allergies    7/4/20 No











ROS


Review of System


CONSTITUTIONAL:        No fever or chills


EYES:                          No recent changes


SKIN:               No rash or itching


CARDIOVASCULAR:     No chest pain, syncope, palpitations, or edema


RESPIRATORY:            No SOB or cough


GASTROINTESTINAL:    No nausea, vomiting or abdominal pain


NEUROLOGICAL:          No headaches or weakness


ENDOCRINE:               No cold or heat intolerance


GENITOURINARY:         No urgency or frequency of urination


MUSCULOSKELETAL:   No back pain or joint pain


LYMPHATICS:               No enlarged lymph nodes


PSYCHIATRIC:              No anxiety or depression





Physical Exam


Physical Exam


GEN.:    No apparent distress.  Alert and oriented.


HEENT:    Head is normocephalic, atraumatic


NECK:    Supple.  


LUNGS:    Clear to auscultation.


HEART:    RRR, S1, S2 present.  Peripheral pulses intact


ABDOMEN:    Soft, nontender.  Positive bowel sounds.


EXTREMITIES:    Without any cyanosis.    


NEUROLOGIC:     Normal speech, normal tone


PSYCHIATRIC:    Normal affect, normal mood.


SKIN:   No ulcerations





Vitals


Vitals





Vital Signs








  Date Time  Temp Pulse Resp B/P (MAP) Pulse Ox O2 Delivery O2 Flow Rate FiO2


 


7/25/20 21:25   20   Room Air  


 


7/25/20 20:33 97.5 86  114/66 (82) 99   





 97.5       











Labs


Labs





Laboratory Tests








Test


 7/25/20


11:45 7/25/20


12:10


 


Bedside Urine HCG, Qualitative


 Hcg negative


(Negative) 





 


White Blood Count


 


 7.9 x10^3/uL


(4.0-11.0)


 


Red Blood Count


 


 1.88 x10^6/uL


(3.50-5.40)


 


Hemoglobin


 


 7.3 g/dL


(12.0-15.5)


 


Hematocrit


 


 20.5 %


(36.0-47.0)


 


Mean Corpuscular Volume


 


 109 fL


()


 


Mean Corpuscular Hemoglobin  39 pg (25-35) 


 


Mean Corpuscular Hemoglobin


Concent 


 36 g/dL


(31-37)


 


Red Cell Distribution Width


 


 26.7 %


(11.5-14.5)


 


Platelet Count


 


 393 x10^3/uL


(140-400)


 


Neutrophils (%) (Auto)  58 % (31-73) 


 


Lymphocytes (%) (Auto)  24 % (24-48) 


 


Monocytes (%) (Auto)  13 % (0-9) 


 


Eosinophils (%) (Auto)  3 % (0-3) 


 


Basophils (%) (Auto)  1 % (0-3) 


 


Neutrophils # (Auto)


 


 4.6 x10^3/uL


(1.8-7.7)


 


Lymphocytes # (Auto)


 


 1.9 x10^3/uL


(1.0-4.8)


 


Monocytes # (Auto)


 


 1.0 x10^3/uL


(0.0-1.1)


 


Eosinophils # (Auto)


 


 0.2 x10^3/uL


(0.0-0.7)


 


Basophils # (Auto)


 


 0.1 x10^3/uL


(0.0-0.2)


 


Platelet Estimate


 


 Adequate


(ADEQUATE)


 


Polychromasia  Occasional 


 


Poikilocytosis  Mod 


 


Anisocytosis  Marked 


 


Microcytosis  Slight 


 


Macrocytosis  Slight 


 


Sickle Cells  Mod 


 


Target Cells  Occ 


 


Schistocytes  Occ 


 


Sodium Level


 


 141 mmol/L


(136-145)


 


Potassium Level


 


 3.8 mmol/L


(3.5-5.1)


 


Chloride Level


 


 106 mmol/L


()


 


Carbon Dioxide Level


 


 25 mmol/L


(21-32)


 


Anion Gap  10 (6-14) 


 


Blood Urea Nitrogen  7 mg/dL (7-20) 


 


Creatinine


 


 0.8 mg/dL


(0.6-1.0)


 


Estimated GFR


(Cockcroft-Gault) 


 98.8 





 


BUN/Creatinine Ratio  9 (6-20) 


 


Glucose Level


 


 94 mg/dL


(70-99)


 


Calcium Level


 


 8.6 mg/dL


(8.5-10.1)


 


Total Bilirubin


 


 0.6 mg/dL


(0.2-1.0)


 


Aspartate Amino Transf


(AST/SGOT) 


 29 U/L (15-37) 





 


Alanine Aminotransferase


(ALT/SGPT) 


 26 U/L (14-59) 





 


Alkaline Phosphatase


 


 115 U/L


()


 


Total Protein


 


 7.3 g/dL


(6.4-8.2)


 


Albumin


 


 3.8 g/dL


(3.4-5.0)


 


Albumin/Globulin Ratio  1.1 (1.0-1.7) 








Laboratory Tests








Test


 7/25/20


11:45 7/25/20


12:10


 


Bedside Urine HCG, Qualitative


 Hcg negative


(Negative) 





 


White Blood Count


 


 7.9 x10^3/uL


(4.0-11.0)


 


Red Blood Count


 


 1.88 x10^6/uL


(3.50-5.40)


 


Hemoglobin


 


 7.3 g/dL


(12.0-15.5)


 


Hematocrit


 


 20.5 %


(36.0-47.0)


 


Mean Corpuscular Volume


 


 109 fL


()


 


Mean Corpuscular Hemoglobin  39 pg (25-35) 


 


Mean Corpuscular Hemoglobin


Concent 


 36 g/dL


(31-37)


 


Red Cell Distribution Width


 


 26.7 %


(11.5-14.5)


 


Platelet Count


 


 393 x10^3/uL


(140-400)


 


Neutrophils (%) (Auto)  58 % (31-73) 


 


Lymphocytes (%) (Auto)  24 % (24-48) 


 


Monocytes (%) (Auto)  13 % (0-9) 


 


Eosinophils (%) (Auto)  3 % (0-3) 


 


Basophils (%) (Auto)  1 % (0-3) 


 


Neutrophils # (Auto)


 


 4.6 x10^3/uL


(1.8-7.7)


 


Lymphocytes # (Auto)


 


 1.9 x10^3/uL


(1.0-4.8)


 


Monocytes # (Auto)


 


 1.0 x10^3/uL


(0.0-1.1)


 


Eosinophils # (Auto)


 


 0.2 x10^3/uL


(0.0-0.7)


 


Basophils # (Auto)


 


 0.1 x10^3/uL


(0.0-0.2)


 


Platelet Estimate


 


 Adequate


(ADEQUATE)


 


Polychromasia  Occasional 


 


Poikilocytosis  Mod 


 


Anisocytosis  Marked 


 


Microcytosis  Slight 


 


Macrocytosis  Slight 


 


Sickle Cells  Mod 


 


Target Cells  Occ 


 


Schistocytes  Occ 


 


Sodium Level


 


 141 mmol/L


(136-145)


 


Potassium Level


 


 3.8 mmol/L


(3.5-5.1)


 


Chloride Level


 


 106 mmol/L


()


 


Carbon Dioxide Level


 


 25 mmol/L


(21-32)


 


Anion Gap  10 (6-14) 


 


Blood Urea Nitrogen  7 mg/dL (7-20) 


 


Creatinine


 


 0.8 mg/dL


(0.6-1.0)


 


Estimated GFR


(Cockcroft-Gault) 


 98.8 





 


BUN/Creatinine Ratio  9 (6-20) 


 


Glucose Level


 


 94 mg/dL


(70-99)


 


Calcium Level


 


 8.6 mg/dL


(8.5-10.1)


 


Total Bilirubin


 


 0.6 mg/dL


(0.2-1.0)


 


Aspartate Amino Transf


(AST/SGOT) 


 29 U/L (15-37) 





 


Alanine Aminotransferase


(ALT/SGPT) 


 26 U/L (14-59) 





 


Alkaline Phosphatase


 


 115 U/L


()


 


Total Protein


 


 7.3 g/dL


(6.4-8.2)


 


Albumin


 


 3.8 g/dL


(3.4-5.0)


 


Albumin/Globulin Ratio  1.1 (1.0-1.7) 











VTE Prophylaxis Ordered


VTE Prophylaxis Devices:  No


VTE Pharmacological Prophylaxi:  Yes





Assessment/Plan


Assessment/Plan


Sickle cell anemia


Chronic pain syndrome


Chronic narcotic use








Plan


LR


Pain management


Resume home medications once available for review


Reassess in the a.m.


Hopefully discharge soon once her pain is better controlled


DVT prophylaxis with SCDs





Justicifation of Admission Dx:


Justifications for Admission:


Justification of Admission Dx:  Yes











JUNE DONOVAN MD             Jul 25, 2020 21:43

## 2020-07-26 VITALS — SYSTOLIC BLOOD PRESSURE: 99 MMHG | DIASTOLIC BLOOD PRESSURE: 59 MMHG

## 2020-07-26 VITALS — DIASTOLIC BLOOD PRESSURE: 68 MMHG | SYSTOLIC BLOOD PRESSURE: 114 MMHG

## 2020-07-26 LAB
ANION GAP SERPL CALC-SCNC: 9 MMOL/L (ref 6–14)
BASOPHILS # BLD AUTO: 0.1 X10^3/UL (ref 0–0.2)
BASOPHILS NFR BLD: 1 % (ref 0–3)
BUN SERPL-MCNC: 4 MG/DL (ref 7–20)
CALCIUM SERPL-MCNC: 8 MG/DL (ref 8.5–10.1)
CHLORIDE SERPL-SCNC: 106 MMOL/L (ref 98–107)
CO2 SERPL-SCNC: 26 MMOL/L (ref 21–32)
CREAT SERPL-MCNC: 0.6 MG/DL (ref 0.6–1)
EOSINOPHIL NFR BLD: 0.2 X10^3/UL (ref 0–0.7)
EOSINOPHIL NFR BLD: 3 % (ref 0–3)
ERYTHROCYTE [DISTWIDTH] IN BLOOD BY AUTOMATED COUNT: 26.7 % (ref 11.5–14.5)
GFR SERPLBLD BASED ON 1.73 SQ M-ARVRAT: 137.7 ML/MIN
GLUCOSE SERPL-MCNC: 81 MG/DL (ref 70–99)
HCT VFR BLD CALC: 21.5 % (ref 36–47)
HGB BLD-MCNC: 7.4 G/DL (ref 12–15.5)
LYMPHOCYTES # BLD: 1.2 X10^3/UL (ref 1–4.8)
LYMPHOCYTES NFR BLD AUTO: 15 % (ref 24–48)
MCH RBC QN AUTO: 38 PG (ref 25–35)
MCHC RBC AUTO-ENTMCNC: 34 G/DL (ref 31–37)
MCV RBC AUTO: 112 FL (ref 79–100)
MONO #: 0.8 X10^3/UL (ref 0–1.1)
MONOCYTES NFR BLD: 10 % (ref 0–9)
NEUT #: 5.8 X10^3/UL (ref 1.8–7.7)
NEUTROPHILS NFR BLD AUTO: 72 % (ref 31–73)
PLATELET # BLD AUTO: 381 X10^3/UL (ref 140–400)
POTASSIUM SERPL-SCNC: 3.5 MMOL/L (ref 3.5–5.1)
RBC # BLD AUTO: 1.92 X10^6/UL (ref 3.5–5.4)
SODIUM SERPL-SCNC: 141 MMOL/L (ref 136–145)
WBC # BLD AUTO: 8.2 X10^3/UL (ref 4–11)

## 2020-07-26 RX ADMIN — MORPHINE SULFATE PRN MG: 4 INJECTION, SOLUTION INTRAMUSCULAR; INTRAVENOUS at 00:28

## 2020-07-26 RX ADMIN — MORPHINE SULFATE PRN MG: 4 INJECTION, SOLUTION INTRAMUSCULAR; INTRAVENOUS at 05:23

## 2020-07-26 RX ADMIN — MORPHINE SULFATE PRN MG: 4 INJECTION, SOLUTION INTRAMUSCULAR; INTRAVENOUS at 09:17

## 2020-07-26 NOTE — NUR
pt was dischagred home today with self care. she was given a script in hand for oxycodone. 
went into room to get pt to sign paperwork and she wanted a cab pass, called supervisor for 
pass and she told me it would be a little bit before she could get it to me. we took out the 
pt's IV while we were waiting. the pt was in room for about 2 hrs with boyfriend before she  
got very upset that we had not gotten cab for her or that she didnt get any pain meds. the 
only pain meds on her MAR was for IV morphine, no IV access to give to pt. was going to get 
a one time dose for oxycodone but pt became very upset and told us she was leaving, that her 
uncle would come pick her up. boyfriend kept asking for wheelchair. told him he needed to be 
escorted out by RN or CNA, she got into wheelchair without signing papers and had boyfriend 
push her to ED exit, had CNA follow pt. discharge paperwork was not signed. Patrick REYES RN

## 2020-07-26 NOTE — PDOC3
Discharge Summary


Visit Information


Date of Admission:  Jul 25, 2020


Date of Discharge:  Jul 26, 2020


Admitting Diagnosis Comment:


Sickle cell anemia


Chronic pain syndrome


Chronic narcotic use


Final Diagnosis


Problems


Medical Problems:


(1) Prescription refill


Status: Acute  





(2) Sickle cell anemia with pain


Status: Acute  








Brief Hospital Course


Allergies





                                    Allergies








Coded Allergies Type Severity Reaction Last Updated Verified


 


  No Known Drug Allergies    7/4/20 No








Vital Signs





Vital Signs








  Date Time  Temp Pulse Resp B/P (MAP) Pulse Ox O2 Delivery O2 Flow Rate FiO2


 


7/26/20 09:50     100 Room Air  


 


7/26/20 07:59 98.1 80 18 99/59 (72)    





 98.1       








Lab Results





Laboratory Tests








Test


 7/25/20


11:45 7/25/20


12:10 7/26/20


09:05


 


Bedside Urine HCG, Qualitative


 Hcg negative


(Negative) 


 





 


White Blood Count


 


 7.9 x10^3/uL


(4.0-11.0) 8.2 x10^3/uL


(4.0-11.0)


 


Red Blood Count


 


 1.88 x10^6/uL


(3.50-5.40) 1.92 x10^6/uL


(3.50-5.70)


 


Hemoglobin


 


 7.3 g/dL


(12.0-15.5) 7.4 g/dL


(12.0-15.5)


 


Hematocrit


 


 20.5 %


(36.0-47.0) 21.5 %


(36.0-47.0)


 


Mean Corpuscular Volume


 


 109 fL


() 112 fL


()


 


Mean Corpuscular Hemoglobin  39 pg (25-35)  38 pg (25-35) 


 


Mean Corpuscular Hemoglobin


Concent 


 36 g/dL


(31-37) 34 g/dL


(31-37)


 


Red Cell Distribution Width


 


 26.7 %


(11.5-14.5) 26.7 %


(11.5-14.5)


 


Platelet Count


 


 393 x10^3/uL


(140-400) 381 x10^3/uL


(140-400)


 


Neutrophils (%) (Auto)  58 % (31-73)  72 % (31-73) 


 


Lymphocytes (%) (Auto)  24 % (24-48)  15 % (24-48) 


 


Monocytes (%) (Auto)  13 % (0-9)  10 % (0-9) 


 


Eosinophils (%) (Auto)  3 % (0-3)  3 % (0-3) 


 


Basophils (%) (Auto)  1 % (0-3)  1 % (0-3) 


 


Neutrophils # (Auto)


 


 4.6 x10^3/uL


(1.8-7.7) 5.8 x10^3/uL


(1.8-7.7)


 


Lymphocytes # (Auto)


 


 1.9 x10^3/uL


(1.0-4.8) 1.2 x10^3/uL


(1.0-4.8)


 


Monocytes # (Auto)


 


 1.0 x10^3/uL


(0.0-1.1) 0.8 x10^3/uL


(0.0-1.1)


 


Eosinophils # (Auto)


 


 0.2 x10^3/uL


(0.0-0.7) 0.2 x10^3/uL


(0.0-0.7)


 


Basophils # (Auto)


 


 0.1 x10^3/uL


(0.0-0.2) 0.1 x10^3/uL


(0.0-0.2)


 


Platelet Estimate


 


 Adequate


(ADEQUATE) 





 


Polychromasia  Occasional  


 


Poikilocytosis  Mod  


 


Anisocytosis  Marked  


 


Microcytosis  Slight  


 


Macrocytosis  Slight  


 


Sickle Cells  Mod  


 


Target Cells  Occ  


 


Schistocytes  Occ  


 


Sodium Level


 


 141 mmol/L


(136-145) 141 mmol/L


(136-145)


 


Potassium Level


 


 3.8 mmol/L


(3.5-5.1) 3.5 mmol/L


(3.5-5.1)


 


Chloride Level


 


 106 mmol/L


() 106 mmol/L


()


 


Carbon Dioxide Level


 


 25 mmol/L


(21-32) 26 mmol/L


(21-32)


 


Anion Gap  10 (6-14)  9 (6-14) 


 


Blood Urea Nitrogen  7 mg/dL (7-20)  4 mg/dL (7-20) 


 


Creatinine


 


 0.8 mg/dL


(0.6-1.0) 0.6 mg/dL


(0.6-1.0)


 


Estimated GFR


(Cockcroft-Gault) 


 98.8 


 137.7 





 


BUN/Creatinine Ratio  9 (6-20)  


 


Glucose Level


 


 94 mg/dL


(70-99) 81 mg/dL


(70-99)


 


Calcium Level


 


 8.6 mg/dL


(8.5-10.1) 8.0 mg/dL


(8.5-10.1)


 


Total Bilirubin


 


 0.6 mg/dL


(0.2-1.0) 





 


Aspartate Amino Transf


(AST/SGOT) 


 29 U/L (15-37) 


 





 


Alanine Aminotransferase


(ALT/SGPT) 


 26 U/L (14-59) 


 





 


Alkaline Phosphatase


 


 115 U/L


() 





 


Total Protein


 


 7.3 g/dL


(6.4-8.2) 





 


Albumin


 


 3.8 g/dL


(3.4-5.0) 





 


Albumin/Globulin Ratio  1.1 (1.0-1.7)  


 


Absolute Reticulocyte Count


 


 


 0.181 x10^6/uL


(0.020-0.120)


 


Percent Reticulocyte Count


 


 


 9.4 %


(0.5-2.3)


 


Immature Reticulocyte Fraction


 


 


 0.76


(0.20-0.60)








Laboratory Tests








Test


 7/25/20


11:45 7/25/20


12:10 7/26/20


09:05


 


Bedside Urine HCG, Qualitative


 Hcg negative


(Negative) 


 





 


White Blood Count


 


 7.9 x10^3/uL


(4.0-11.0) 8.2 x10^3/uL


(4.0-11.0)


 


Red Blood Count


 


 1.88 x10^6/uL


(3.50-5.40) 1.92 x10^6/uL


(3.50-5.70)


 


Hemoglobin


 


 7.3 g/dL


(12.0-15.5) 7.4 g/dL


(12.0-15.5)


 


Hematocrit


 


 20.5 %


(36.0-47.0) 21.5 %


(36.0-47.0)


 


Mean Corpuscular Volume


 


 109 fL


() 112 fL


()


 


Mean Corpuscular Hemoglobin  39 pg (25-35)  38 pg (25-35) 


 


Mean Corpuscular Hemoglobin


Concent 


 36 g/dL


(31-37) 34 g/dL


(31-37)


 


Red Cell Distribution Width


 


 26.7 %


(11.5-14.5) 26.7 %


(11.5-14.5)


 


Platelet Count


 


 393 x10^3/uL


(140-400) 381 x10^3/uL


(140-400)


 


Neutrophils (%) (Auto)  58 % (31-73)  72 % (31-73) 


 


Lymphocytes (%) (Auto)  24 % (24-48)  15 % (24-48) 


 


Monocytes (%) (Auto)  13 % (0-9)  10 % (0-9) 


 


Eosinophils (%) (Auto)  3 % (0-3)  3 % (0-3) 


 


Basophils (%) (Auto)  1 % (0-3)  1 % (0-3) 


 


Neutrophils # (Auto)


 


 4.6 x10^3/uL


(1.8-7.7) 5.8 x10^3/uL


(1.8-7.7)


 


Lymphocytes # (Auto)


 


 1.9 x10^3/uL


(1.0-4.8) 1.2 x10^3/uL


(1.0-4.8)


 


Monocytes # (Auto)


 


 1.0 x10^3/uL


(0.0-1.1) 0.8 x10^3/uL


(0.0-1.1)


 


Eosinophils # (Auto)


 


 0.2 x10^3/uL


(0.0-0.7) 0.2 x10^3/uL


(0.0-0.7)


 


Basophils # (Auto)


 


 0.1 x10^3/uL


(0.0-0.2) 0.1 x10^3/uL


(0.0-0.2)


 


Platelet Estimate


 


 Adequate


(ADEQUATE) 





 


Polychromasia  Occasional  


 


Poikilocytosis  Mod  


 


Anisocytosis  Marked  


 


Microcytosis  Slight  


 


Macrocytosis  Slight  


 


Sickle Cells  Mod  


 


Target Cells  Occ  


 


Schistocytes  Occ  


 


Sodium Level


 


 141 mmol/L


(136-145) 141 mmol/L


(136-145)


 


Potassium Level


 


 3.8 mmol/L


(3.5-5.1) 3.5 mmol/L


(3.5-5.1)


 


Chloride Level


 


 106 mmol/L


() 106 mmol/L


()


 


Carbon Dioxide Level


 


 25 mmol/L


(21-32) 26 mmol/L


(21-32)


 


Anion Gap  10 (6-14)  9 (6-14) 


 


Blood Urea Nitrogen  7 mg/dL (7-20)  4 mg/dL (7-20) 


 


Creatinine


 


 0.8 mg/dL


(0.6-1.0) 0.6 mg/dL


(0.6-1.0)


 


Estimated GFR


(Cockcroft-Gault) 


 98.8 


 137.7 





 


BUN/Creatinine Ratio  9 (6-20)  


 


Glucose Level


 


 94 mg/dL


(70-99) 81 mg/dL


(70-99)


 


Calcium Level


 


 8.6 mg/dL


(8.5-10.1) 8.0 mg/dL


(8.5-10.1)


 


Total Bilirubin


 


 0.6 mg/dL


(0.2-1.0) 





 


Aspartate Amino Transf


(AST/SGOT) 


 29 U/L (15-37) 


 





 


Alanine Aminotransferase


(ALT/SGPT) 


 26 U/L (14-59) 


 





 


Alkaline Phosphatase


 


 115 U/L


() 





 


Total Protein


 


 7.3 g/dL


(6.4-8.2) 





 


Albumin


 


 3.8 g/dL


(3.4-5.0) 





 


Albumin/Globulin Ratio  1.1 (1.0-1.7)  


 


Absolute Reticulocyte Count


 


 


 0.181 x10^6/uL


(0.020-0.120)


 


Percent Reticulocyte Count


 


 


 9.4 %


(0.5-2.3)


 


Immature Reticulocyte Fraction


 


 


 0.76


(0.20-0.60)








Brief Hospital Course


Ms. Nation  is a 35 old female who presented with uncontrolled generalized pain as

a consequence of her sickle cell disease.  The patient is currently changing 

from one hospital system to another in order to continue with her sickle cell 

care.  She was going to be discharged yesterday from the emergency department 

but unfortunately was not able to control her pain well, fluids were given to 

the patient throughout the night and her symptoms have resolved at this time.  

Her anemia seems to be at baseline and she is currently taking Hydrea no changes

have been made to her medications, my suspicion is that patient ran out of 

narcotics as she said and came to the ER in order to have pain control.  K 

tracts was utilized unfortunately there was no record of the patient which seems

count of altered since he most likely has been on chronic narcotics for a long 

time.


He was hemodynamically stable and in good spirits to be discharged home I have 

advised to follow-up with her new doctor as soon as possible noted to establish 

care again.





Physical exam


Gen.: well-developed well-nourished in no apparent distress


Head: Normal shape atraumatic


Eyes: Pupils equal reactive to light and accommodation, normal conjunctivae and 

lids


Ears: Normal shape


Nose: Normal shape no trauma


Mouth: No exudates of the back of throat no thrush no lesions


Neck: Supple no JVD no carotid bruit or lymphadenopathy no thyromegaly


Chest: Lungs clear to auscultation with good inspiratory effort no crackles 

rales or rhonchi


Cardiovascular: S1-S2 regular rhythm no murmurs gallops or rubs


Abdomen: Bowel sounds present soft nontender no hepatosplenomegaly appreciated 

sign Extremities: No clubbing no cyanosis no edema peripheral pulses palpated bi

laterally


Neurological: Alert awake oriented in person time place and situation, cranial 

nerves II through XII intact, no motor or sensory deficits appreciated


Psych: Appropriate mood, cooperative





Discharge Information


Condition at Discharge:  Improved


Follow Up:  Weeks


Disposition/Orders:  D/C to Home


Scheduled


Aspirin (Children's Aspirin) 81 Mg Tab.chew, 1 TAB PO DAILY for blood thinner 

for 30 Days, #30 Ref 0 (Reported)


   Entered as Reported by: WON ONTIVEROS on 7/25/20 2104


   Last Action: New Order on 7/25/20 2104 by WON ONTIVEROS


Fluoxetine Hcl (Prozac) 40 Mg Capsule, 1 CAP PO BID for depression, #30 Ref 3 

(Reported)


   Entered as Reported by: WON ONTIVEROS on 7/25/20 2104


   Last Action: New Order on 7/25/20 2104 by WON ONTIVEROS


Hydroxyurea (Hydroxyurea) 500 Mg Capsule, 500 MG PO BID for sickle cell, 

(Reported)


   Entered as Reported by: WON ONTIVEROS on 7/25/20 2104


   Last Action: New Order on 7/25/20 2104 by WON ONTIVEROS


Multivitamin (One-Daily Multi-Vitamin) 1 Each Tablet, 1 TAB PO DAILY for 

supplement for 30 Days, #30 Ref 0 (Reported)


   Entered as Reported by: WON ONTIVEROS on 7/25/20 2104


   Last Action: New Order on 7/25/20 2104 by WON ONTIVEROS


Trazodone Hcl (Trazodone Hcl) 100 Mg Tablet, 1 TAB PO QHS for sleep, #30 Ref 1 

(Reported)


   Entered as Reported by: WON ONTIVEROS on 7/25/20 2104


   Last Action: New Order on 7/25/20 2104 by WON ONTIVEROS


Vitamin D3/Vitamin K2 (D3 + K2 Dots 1,000 Units Tab) 1 Each Tab.rapdis, 1 TAB PO

DAILY for supplement for 30 Days, #30 Ref 0 (Reported)


   Entered as Reported by: WON ONTIVEROS on 7/25/20 2104


   Last Action: New Order on 7/25/20 2104 by WON ONTIVEROS





Scheduled PRN


Oxycodone Hcl (Oxycodone Hcl) 20 Mg Tablet, 20 MG PO Q6HRS PRN for PAIN for 5 

Days, #20 Ref 0


   Prescribed by: JUNE DONOVAN MD on 7/26/20 1028





Justicifation of Admission Dx:


Justifications for Admission:


Justification of Admission Dx:  Yes











JUNE DONOVAN MD             Jul 26, 2020 10:31

## 2020-09-11 ENCOUNTER — HOSPITAL ENCOUNTER (INPATIENT)
Dept: HOSPITAL 61 - ER | Age: 35
LOS: 4 days | Discharge: LEFT BEFORE DISCHARGE | DRG: 683 | End: 2020-09-15
Attending: INTERNAL MEDICINE | Admitting: INTERNAL MEDICINE
Payer: MEDICARE

## 2020-09-11 VITALS — BODY MASS INDEX: 19.14 KG/M2 | WEIGHT: 114.86 LBS | HEIGHT: 65 IN

## 2020-09-11 VITALS — SYSTOLIC BLOOD PRESSURE: 107 MMHG | DIASTOLIC BLOOD PRESSURE: 56 MMHG

## 2020-09-11 DIAGNOSIS — Z91.040: ICD-10-CM

## 2020-09-11 DIAGNOSIS — Z96.649: ICD-10-CM

## 2020-09-11 DIAGNOSIS — Z86.73: ICD-10-CM

## 2020-09-11 DIAGNOSIS — Z53.29: ICD-10-CM

## 2020-09-11 DIAGNOSIS — R74.0: ICD-10-CM

## 2020-09-11 DIAGNOSIS — D57.1: ICD-10-CM

## 2020-09-11 DIAGNOSIS — N17.0: Primary | ICD-10-CM

## 2020-09-11 DIAGNOSIS — Z83.3: ICD-10-CM

## 2020-09-11 DIAGNOSIS — R17: ICD-10-CM

## 2020-09-11 DIAGNOSIS — G89.29: ICD-10-CM

## 2020-09-11 DIAGNOSIS — Z76.5: ICD-10-CM

## 2020-09-11 DIAGNOSIS — D75.89: ICD-10-CM

## 2020-09-11 DIAGNOSIS — Z88.8: ICD-10-CM

## 2020-09-11 LAB
% LYMPHS: 13 % (ref 24–48)
% MONOS: 13 % (ref 0–10)
% SEGS: 72 % (ref 35–66)
ALBUMIN SERPL-MCNC: 4 G/DL (ref 3.4–5)
ALBUMIN/GLOB SERPL: 1 {RATIO} (ref 1–1.7)
ALP SERPL-CCNC: 138 U/L (ref 46–116)
ALT SERPL-CCNC: 37 U/L (ref 14–59)
ANION GAP SERPL CALC-SCNC: 10 MMOL/L (ref 6–14)
ANISOCYTOSIS BLD QL SMEAR: SLIGHT
AST SERPL-CCNC: 66 U/L (ref 15–37)
BASOPHILS # BLD AUTO: 0.1 X10^3/UL (ref 0–0.2)
BASOPHILS NFR BLD: 1 % (ref 0–3)
BILIRUB SERPL-MCNC: 1.5 MG/DL (ref 0.2–1)
BUN SERPL-MCNC: 16 MG/DL (ref 7–20)
BUN/CREAT SERPL: 13 (ref 6–20)
BURR CELLS BLD QL SMEAR: (no result)
CALCIUM SERPL-MCNC: 8.7 MG/DL (ref 8.5–10.1)
CHLORIDE SERPL-SCNC: 105 MMOL/L (ref 98–107)
CO2 SERPL-SCNC: 24 MMOL/L (ref 21–32)
CREAT SERPL-MCNC: 1.2 MG/DL (ref 0.6–1)
DACRYOCYTES BLD QL SMEAR: (no result)
EOSINOPHIL NFR BLD AUTO: 2 % (ref 0–5)
EOSINOPHIL NFR BLD: 0.2 X10^3/UL (ref 0–0.7)
EOSINOPHIL NFR BLD: 2 % (ref 0–3)
ERYTHROCYTE [DISTWIDTH] IN BLOOD BY AUTOMATED COUNT: 17.7 % (ref 11.5–14.5)
GFR SERPLBLD BASED ON 1.73 SQ M-ARVRAT: 61.9 ML/MIN
GLOBULIN SER-MCNC: 4 G/DL (ref 2.2–3.8)
GLUCOSE SERPL-MCNC: 101 MG/DL (ref 70–99)
HCT VFR BLD CALC: 22.6 % (ref 36–47)
HGB BLD-MCNC: 7.6 G/DL (ref 12–15.5)
HGB S BLD QL SOLY: (no result)
LYMPHOCYTES # BLD: 1.5 X10^3/UL (ref 1–4.8)
LYMPHOCYTES NFR BLD AUTO: 11 % (ref 24–48)
MCH RBC QN AUTO: 34 PG (ref 25–35)
MCHC RBC AUTO-ENTMCNC: 34 G/DL (ref 31–37)
MCV RBC AUTO: 101 FL (ref 79–100)
MONO #: 2 X10^3/UL (ref 0–1.1)
MONOCYTES NFR BLD: 14 % (ref 0–9)
NEUT #: 10 X10^3/UL (ref 1.8–7.7)
NEUTROPHILS NFR BLD AUTO: 72 % (ref 31–73)
NRBC # BLD MANUAL: 9 10*3/UL
PLATELET # BLD AUTO: 227 X10^3/UL (ref 140–400)
PLATELET # BLD EST: ADEQUATE 10*3/UL
POIKILOCYTOSIS BLD QL SMEAR: (no result)
POLYCHROMASIA BLD QL SMEAR: (no result)
POTASSIUM SERPL-SCNC: 4.2 MMOL/L (ref 3.5–5.1)
PROT SERPL-MCNC: 8 G/DL (ref 6.4–8.2)
RBC # BLD AUTO: 2.24 X10^6/UL (ref 3.5–5.4)
SCHISTOCYTES BLD QL SMEAR: (no result)
SODIUM SERPL-SCNC: 139 MMOL/L (ref 136–145)
TARGETS BLD QL SMEAR: (no result)
WBC # BLD AUTO: 13.8 X10^3/UL (ref 4–11)

## 2020-09-11 PROCEDURE — 83540 ASSAY OF IRON: CPT

## 2020-09-11 PROCEDURE — 82607 VITAMIN B-12: CPT

## 2020-09-11 PROCEDURE — G0379 DIRECT REFER HOSPITAL OBSERV: HCPCS

## 2020-09-11 PROCEDURE — 36415 COLL VENOUS BLD VENIPUNCTURE: CPT

## 2020-09-11 PROCEDURE — 83615 LACTATE (LD) (LDH) ENZYME: CPT

## 2020-09-11 PROCEDURE — 83550 IRON BINDING TEST: CPT

## 2020-09-11 PROCEDURE — G0378 HOSPITAL OBSERVATION PER HR: HCPCS

## 2020-09-11 PROCEDURE — 96361 HYDRATE IV INFUSION ADD-ON: CPT

## 2020-09-11 PROCEDURE — 96374 THER/PROPH/DIAG INJ IV PUSH: CPT

## 2020-09-11 PROCEDURE — 85025 COMPLETE CBC W/AUTO DIFF WBC: CPT

## 2020-09-11 PROCEDURE — 85007 BL SMEAR W/DIFF WBC COUNT: CPT

## 2020-09-11 PROCEDURE — 80053 COMPREHEN METABOLIC PANEL: CPT

## 2020-09-11 PROCEDURE — 85045 AUTOMATED RETICULOCYTE COUNT: CPT

## 2020-09-11 PROCEDURE — 94760 N-INVAS EAR/PLS OXIMETRY 1: CPT

## 2020-09-11 PROCEDURE — 80307 DRUG TEST PRSMV CHEM ANLYZR: CPT

## 2020-09-11 PROCEDURE — 96376 TX/PRO/DX INJ SAME DRUG ADON: CPT

## 2020-09-11 PROCEDURE — 96375 TX/PRO/DX INJ NEW DRUG ADDON: CPT

## 2020-09-11 RX ADMIN — BACITRACIN SCH MLS/HR: 5000 INJECTION, POWDER, FOR SOLUTION INTRAMUSCULAR at 22:00

## 2020-09-11 RX ADMIN — TRAZODONE HYDROCHLORIDE SCH MG: 100 TABLET ORAL at 21:00

## 2020-09-11 RX ADMIN — HYDROXYUREA SCH MG: 500 CAPSULE ORAL at 23:13

## 2020-09-11 NOTE — NUR
Dr. Shahrzad cordova.

Patient shouting at her  on phone.  Cursing and accusations abound.

Patient requesting turkey sandwich.

## 2020-09-11 NOTE — PHYS DOC
Past Medical History


Past Medical History:  Sickle Cell Disease, Stroke


Additional Past Medical Histor:  HIP REPLACEMENT X2, ACUTE CHEST SYNDROME


 (MONCHO ESCALANTE)


Past Surgical History:  Hip Replacement


Additional Past Surgical Histo:  brain shunt, hip replacement x 2


 (MONCHO ESCALANTE)


Smoking Status:  Never Smoker


Alcohol Use:  None


 (MONCHO ESCALANTE)





General Adult


EDM:


Chief Complaint:  PAIN CONTROL





HPI:


HPI:





Patient is a 35  year old AA female who presents to the emergency department 

with request for pain medication.  Patient states that she is having a flareup 

of her sickle cell pain.  She has been taking her pain medication at home with 

no relief of her symptoms.  She states that this flareup began 3 days ago.  She 

states that the only medication that works for her sickle cell episodes is 

morphine, alternated with Dilaudid and Benadryl.  She denies any fever, cough, 

shortness of breath, abdominal pain, nausea, vomiting, or diarrhea.  The patient

currently rates her pain a 10 out of 10 on pain scale, she denies any 

alleviating or exacerbating factors.  She reports that the pain is located 

everywhere.  Patient is very short with her responses therefore the HPI is 

limited.


 (MONCHO ESCALANTE)





Review of Systems:


Review of Systems:


Complete ROS is negative unless otherwise stated in the HPI.


 (MONCHO ESCALANTE)





Heart Score:


Risk Factors:


Risk Factors:  DM, Current or recent (<one month) smoker, HTN, HLP, family 

history of CAD, obesity.


Risk Scores:


Score 0 - 3:  2.5% MACE over next 6 weeks - Discharge Home


Score 4 - 6:  20.3% MACE over next 6 weeks - Admit for Clinical Observation


Score 7 - 10:  72.7% MACE over next 6 weeks - Early Invasive Strategies


 (MONCHO ESCALANTE)





Current Medications:





Current Medications








 Medications


  (Trade)  Dose


 Ordered  Sig/Caren  Start Time


 Stop Time Status Last Admin


Dose Admin


 


 Diphenhydramine


 HCl


  (Benadryl)  25 mg  1X  ONCE  9/11/20 19:30


 9/11/20 19:31 DC 9/11/20 19:23


25 MG


 


 Hydromorphone HCl


  (Dilaudid)  2 mg  STK-MED ONCE  9/11/20 20:06


 9/11/20 20:07 DC  





 


 Morphine Sulfate


  (Morphine


 Sulfate)  4 mg  1X  ONCE  9/11/20 19:30


 9/11/20 19:31 DC 9/11/20 19:24


4 MG


 


 Sodium Chloride  1,000 ml @ 


 1,000 mls/hr  1X  ONCE  9/11/20 19:30


 9/11/20 20:29 DC 9/11/20 19:29


1,000 MLS/HR








 (MONCHO ESCALANTE)





Allergies:


Allergies:





Allergies








Coded Allergies Type Severity Reaction Last Updated Verified


 


  latex Allergy Intermediate RIPS SKIN OFF 9/11/20 Yes


 


  adhesive tape Allergy Mild  9/11/20 Yes








 (MONCHO ESCALANTE)





Physical Exam:


PE:





Constitutional: Well developed, well nourished, no acute distress, non-toxic 

appearance, appears irritated. []


HENT: Normocephalic, atraumatic, bilateral external ears normal, nose normal. []


Eyes: PERRLA, EOMI, conjunctiva normal, no discharge. [] 


Neck: Normal range of motion, no stridor. [] 


Cardiovascular:Heart rate regular rhythm


Lungs & Thorax:  Bilateral breath sounds clear to auscultation, my lungs []


Abdomen: soft, no tenderness


Skin: Warm, dry, no erythema, no rash. [] 


Extremities: No cyanosis, ROM intact, no edema. [] 


Neurologic: Alert and oriented X 3, no focal deficits noted. []


Psychologic: Affect normal, judgement normal, mood normal. []


 (MONCHO ESCALANTE)





Current Patient Data:


Labs:





                                Laboratory Tests








Test


 9/11/20


19:00


 


White Blood Count


 13.8 x10^3/uL


(4.0-11.0)  H


 


Red Blood Count


 2.24 x10^6/uL


(3.50-5.70)  L


 


Hemoglobin


 7.6 g/dL


(12.0-15.5)  L


 


Hematocrit


 22.6 %


(36.0-47.0)  L


 


Mean Corpuscular Volume


 101 fL


()  H


 


Mean Corpuscular Hemoglobin 34 pg (25-35)  


 


Mean Corpuscular Hemoglobin


Concent 34 g/dL


(31-37)


 


Red Cell Distribution Width


 17.7 %


(11.5-14.5)  H


 


Platelet Count


 227 x10^3/uL


(140-400)


 


Neutrophils (%) (Auto) 72 % (31-73)  


 


Lymphocytes (%) (Auto) 11 % (24-48)  L


 


Monocytes (%) (Auto) 14 % (0-9)  H


 


Eosinophils (%) (Auto) 2 % (0-3)  


 


Basophils (%) (Auto) 1 % (0-3)  


 


Neutrophils # (Auto)


 10.0 x10^3/uL


(1.8-7.7)  H


 


Lymphocytes # (Auto)


 1.5 x10^3/uL


(1.0-4.8)


 


Monocytes # (Auto)


 2.0 x10^3/uL


(0.0-1.1)  H


 


Eosinophils # (Auto)


 0.2 x10^3/uL


(0.0-0.7)


 


Basophils # (Auto)


 0.1 x10^3/uL


(0.0-0.2)


 


Segmented Neutrophils % 72 % (35-66)  H


 


Lymphocytes % 13 % (24-48)  L


 


Monocytes % 13 % (0-10)  H


 


Eosinophils % 2 % (0-5)  


 


Nucleated Red Blood Cells 9  


 


Platelet Estimate


 Adequate


(ADEQUATE)


 


Large Platelets Few  


 


Giant Platelets Occ  


 


Polychromasia Occasional  


 


Poikilocytosis Mod  


 


Anisocytosis Slight  


 


Sickle Cells Mod  


 


Target Cells Mod  


 


Tear Drop Cells Occ  


 


Wake Cells Occ  


 


Schistocytes Occ  


 


Absolute Reticulocyte Count


 0.096 x10^6/uL


(0.020-0.120)


 


Percent Reticulocyte Count


 4.3 %


(0.5-2.3)  H


 


Immature Reticulocyte Fraction


 0.44


(0.20-0.60)


 


Sodium Level


 139 mmol/L


(136-145)


 


Potassium Level


 4.2 mmol/L


(3.5-5.1)


 


Chloride Level


 105 mmol/L


()


 


Carbon Dioxide Level


 24 mmol/L


(21-32)


 


Anion Gap 10 (6-14)  


 


Blood Urea Nitrogen


 16 mg/dL


(7-20)


 


Creatinine


 1.2 mg/dL


(0.6-1.0)  H


 


Estimated GFR


(Cockcroft-Gault) 61.9  





 


BUN/Creatinine Ratio 13 (6-20)  


 


Glucose Level


 101 mg/dL


(70-99)  H


 


Calcium Level


 8.7 mg/dL


(8.5-10.1)


 


Total Bilirubin


 1.5 mg/dL


(0.2-1.0)  H


 


Aspartate Amino Transferase


(AST) 66 U/L (15-37)


H


 


Alanine Aminotransferase (ALT)


 37 U/L (14-59)





 


Alkaline Phosphatase


 138 U/L


()  H


 


Total Protein


 8.0 g/dL


(6.4-8.2)


 


Albumin


 4.0 g/dL


(3.4-5.0)


 


Albumin/Globulin Ratio 1.0 (1.0-1.7)  





                                Laboratory Tests


9/11/20 19:00








                                Laboratory Tests


9/11/20 19:00








Vital Signs:





                                   Vital Signs








  Date Time  Temp Pulse Resp B/P (MAP) Pulse Ox O2 Delivery O2 Flow Rate FiO2


 


9/11/20 20:10   18  95   


 


9/11/20 19:23  82  118/57 (77)  Nasal Cannula 2.0 


 


9/11/20 17:52 98.1       





 98.1       








 (MONCHO ESCALANTE)





EKG:


EKG:


[]


 (MONCHO ESCALANTE)





Radiology/Procedures:


Radiology/Procedures:


[id number]-spoke with Dr. Markham who is the admitting physician, and care was assumed 

following discussion of patient.  Will admit patient for intractable pain as 

observation status.


Patient's vital signs stable.. Patient remains afebrile, appears nontoxic, resp

irations even and unlabored. Patient will be admitted to the telemetry floor. 

Patient's case and plan of care also discussed with Dr. Ortiz


 (MONCHO ESCALANTE)





Course & Med Decision Making:


Course & Med Decision Making


Pertinent Labs and Imaging studies reviewed. (See chart for details)





[]


 (MONCHO ESCALANTE)


Course & Med Decision Making


I have reviewed the PA/NP's note and Plan of Care.  I was available for 

consultation as needed during the patient's visit in the emergency department.  

I agree with the clinical impression, plans and disposition.


 (GERMAN ORTIZ MD)


Dragon Disclaimer:


Dragon Disclaimer:


This electronic medical record was generated, in whole or in part, using a voice

 recognition dictation system.


 (MONCHO ESCALANTE)





Departure


Departure


Impression:  


   Primary Impression:  


   Intractable pain


Disposition:  09 ADMITTED AS INPATIENT


Admitting Physician:  FRANCK Finch)


 (MONCHO ESCALANTE)


Condition:  STABLE


Referrals:  


NO PCP (PCP)





Justicifation of Admission Dx:


Justifications for Admission:


Justification of Admission Dx:  Yes


Comments:


Intractable pain


 (MONCHO ESCALANTE)











MONCHO ESCALANTE       Sep 11, 2020 20:40


GERMAN ORTIZ MD              Sep 12, 2020 00:30

## 2020-09-11 NOTE — NUR
Admit from ER.  Thin, black female, in no apparent distress.  States she "feels dizzy" but 
requests to walk from ER gurney to toilet.  This RN did not witness ambulation.  Patient 
talking rapidly, "I need my Blisterell!"  "I'm so anxious!"  When asked what her 
"Blisterell" was, she started screaming into her cell phone to Google this medicine.  
Repeatedly.  States her generalized pain is 10/10.  States she is allergic to Fentanyl, 
causes tongue to swell and hives, requests "IV Morphine 8mg every 3 hours."  Discussed that 
I would page Dr. Markham re: pain management.

## 2020-09-12 VITALS — DIASTOLIC BLOOD PRESSURE: 46 MMHG | SYSTOLIC BLOOD PRESSURE: 95 MMHG

## 2020-09-12 VITALS — DIASTOLIC BLOOD PRESSURE: 52 MMHG | SYSTOLIC BLOOD PRESSURE: 99 MMHG

## 2020-09-12 VITALS — SYSTOLIC BLOOD PRESSURE: 80 MMHG | DIASTOLIC BLOOD PRESSURE: 38 MMHG

## 2020-09-12 VITALS — SYSTOLIC BLOOD PRESSURE: 92 MMHG | DIASTOLIC BLOOD PRESSURE: 32 MMHG

## 2020-09-12 VITALS — SYSTOLIC BLOOD PRESSURE: 92 MMHG | DIASTOLIC BLOOD PRESSURE: 40 MMHG

## 2020-09-12 RX ADMIN — ENOXAPARIN SODIUM SCH MG: 40 INJECTION SUBCUTANEOUS at 20:43

## 2020-09-12 RX ADMIN — MORPHINE SULFATE PRN MG: 2 INJECTION, SOLUTION INTRAMUSCULAR; INTRAVENOUS at 16:37

## 2020-09-12 RX ADMIN — BACITRACIN SCH MLS/HR: 5000 INJECTION, POWDER, FOR SOLUTION INTRAMUSCULAR at 10:37

## 2020-09-12 RX ADMIN — BACITRACIN SCH MLS/HR: 5000 INJECTION, POWDER, FOR SOLUTION INTRAMUSCULAR at 21:07

## 2020-09-12 RX ADMIN — TRAZODONE HYDROCHLORIDE SCH MG: 100 TABLET ORAL at 20:37

## 2020-09-12 RX ADMIN — ASPIRIN 81 MG SCH MG: 81 TABLET ORAL at 09:12

## 2020-09-12 RX ADMIN — MORPHINE SULFATE PRN MG: 2 INJECTION, SOLUTION INTRAMUSCULAR; INTRAVENOUS at 18:33

## 2020-09-12 RX ADMIN — MULTIPLE VITAMINS W/ MINERALS TAB SCH TAB: TAB at 09:12

## 2020-09-12 RX ADMIN — HYDROXYUREA SCH MG: 500 CAPSULE ORAL at 20:42

## 2020-09-12 RX ADMIN — MORPHINE SULFATE PRN MG: 2 INJECTION, SOLUTION INTRAMUSCULAR; INTRAVENOUS at 22:35

## 2020-09-12 RX ADMIN — HYDROXYUREA SCH MG: 500 CAPSULE ORAL at 10:05

## 2020-09-12 RX ADMIN — MORPHINE SULFATE PRN MG: 2 INJECTION, SOLUTION INTRAMUSCULAR; INTRAVENOUS at 10:37

## 2020-09-12 RX ADMIN — MORPHINE SULFATE PRN MG: 2 INJECTION, SOLUTION INTRAMUSCULAR; INTRAVENOUS at 20:37

## 2020-09-12 RX ADMIN — MORPHINE SULFATE PRN MG: 2 INJECTION, SOLUTION INTRAMUSCULAR; INTRAVENOUS at 13:55

## 2020-09-12 RX ADMIN — VITAMIN D, TAB 1000IU (100/BT) SCH UNIT: 25 TAB at 09:12

## 2020-09-12 NOTE — PDOC1
History and Physical


Date of Service:


DOS:


DATE: 9/12/20 


TIME: 12:39





History of Present Illness:


HPI:


 35  year old -American female who presents to the emergency department 

with request for pain medication.  Patient states that she is having a flareup 

of her sickle cell pain.  She has been taking her pain medication at home with 

no relief of her symptoms.  She states that this flareup began 3 days ago.  She 

states that the only medication that works for her sickle cell episodes is 

morphine, alternated with Dilaudid and Benadryl.  She denies any fever, cough, 

shortness of breath, abdominal pain, nausea, vomiting, or diarrhea.  The patient

currently rates her pain a 10 out of 10 on pain scale, she denies any 

alleviating or exacerbating factors.  She reports that the pain is located 

everywhere.  Patient is very short with her responses therefore the HPI is 

limited.








At the time of my evaluation, patient continues to have the same presentation of

her symptoms.  She describes her bilateral leg and hand of throbbing pain.  She 

feels like it she is having cramps in her hands or legs.  Denies abdominal pain,

shortness of breath, chest pain, bloody stools, jaundice, swelling.





Past Medical/Surgical History:


PMH/PSH:


Past Medical History:  Sickle Cell Disease, Stroke, HIP REPLACEMENT X2, ACUTE 

CHEST SYNDROME


Past Surgical History:  brain shunt





Allergies:


Allergies:  


Coded Allergies:  


     fentanyl (Verified  Allergy, Severe, Swelling, 9/11/20)


   


   "tongue swells up" "hives"


     latex (Verified  Allergy, Intermediate, RIPS SKIN OFF, 9/11/20)


     adhesive tape (Verified  Allergy, Mild, 9/11/20)





Family History:


Family History:


Reviewed and none reported





Social History:


Social History:


Denies alcohol, smoking, drug abuse





Current Medications:


Current Medications





Current Medications


Diphenhydramine HCl (Benadryl) 25 mg 1X  ONCE IVP  Last administered on 

9/11/20at 19:23;  Start 9/11/20 at 19:30;  Stop 9/11/20 at 19:31;  Status DC


Morphine Sulfate (Morphine Sulfate) 4 mg 1X  ONCE IV  Last administered on 

9/11/20at 19:24;  Start 9/11/20 at 19:30;  Stop 9/11/20 at 19:31;  Status DC


Sodium Chloride 1,000 ml @  1,000 mls/hr 1X  ONCE IV  Last administered on 

9/11/20at 19:29;  Start 9/11/20 at 19:30;  Stop 9/11/20 at 20:29;  Status DC


Hydromorphone HCl (Dilaudid) 1 mg 1X  ONCE IVP  Last administered on 9/11/20at 

20:10;  Start 9/11/20 at 20:30;  Stop 9/11/20 at 20:31;  Status DC


Hydromorphone HCl (Dilaudid) 2 mg STK-MED ONCE .ROUTE ;  Start 9/11/20 at 20:06;

 Stop 9/11/20 at 20:07;  Status DC


Sodium Chloride 1,000 ml @  100 mls/hr Q10H IV ;  Start 9/11/20 at 21:00;  Stop 

9/12/20 at 20:59;  Status Cancel


Sodium Chloride 1,000 ml @  100 mls/hr Q10H IV  Last administered on 9/12/20at 

10:37;  Start 9/11/20 at 22:00


Ondansetron HCl (Zofran) 4 mg PRN Q4HRS  PRN IV NAUSEA/VOMITING;  Start 9/11/20 

at 20:45


Zolpidem Tartrate (Ambien) 5 mg PRN QHS  PRN PO INSOMNIA;  Start 9/11/20 at 

20:45


Acetaminophen (Tylenol) 650 mg PRN Q4HRS  PRN PO TEMP OVER 100.4F OR MILD PAIN; 

Start 9/11/20 at 20:45


Clonidine HCl (Catapres) 0.1 mg PRN Q6HRS  PRN PO SBP>160 OR DBP>90;  Start 

9/11/20 at 20:45


Diphenhydramine HCl (Benadryl) 25 mg PRN Q4HRS  PRN IVP ITCHING Last 

administered on 9/12/20at 09:12;  Start 9/11/20 at 20:45


Docusate Sodium (Colace) 100 mg PRN BID  PRN PO HARD STOOLS;  Start 9/11/20 at 

20:45


Albuterol Sulfate (Ventolin Neb Soln) 2.5 mg PRN Q4HRS  PRN NEB SHORTNESS OF 

BREATH;  Start 9/11/20 at 20:45


Guaifenesin (Robitussin) 200 mg PRN Q4HRS  PRN PO COUGH Last administered on 

9/11/20at 22:59;  Start 9/11/20 at 20:45


Lorazepam (Ativan) 0.5 mg PRN Q4HRS  PRN PO ANXIETY / AGITATION Last 

administered on 9/12/20at 12:32;  Start 9/11/20 at 20:45


Aspirin (Aspirin Chewable) 81 mg DAILY PO  Last administered on 9/12/20at 09:12;

 Start 9/12/20 at 09:00


Hydroxyurea (Hydrea) 500 mg BID PO  Last administered on 9/12/20at 10:05;  Start

9/11/20 at 21:00


Trazodone HCl (Desyrel) 100 mg QHS PO  Last administered on 9/11/20at 21:00;  

Start 9/11/20 at 21:00


Fluoxetine HCl (PROzac) 40 mg BID PO  Last administered on 9/12/20at 09:12;  

Start 9/11/20 at 21:00


Multivitamins (Thera M Plus) 1 tab DAILY PO  Last administered on 9/12/20at 

09:12;  Start 9/12/20 at 09:00


Oxycodone HCl (Roxicodone) 20 mg PRN Q6HRS  PRN PO PAIN Last administered on 

9/12/20at 09:11;  Start 9/11/20 at 21:00


Vitamin D (Vitamin D3) 1,000 unit DAILY PO  Last administered on 9/12/20at 

09:12;  Start 9/12/20 at 09:00


Fentanyl Citrate (Fentanyl 2ml Vial) 25 mcg PRN Q3HRS  PRN IV PAIN;  Start 

9/11/20 at 20:45;  Stop 9/11/20 at 23:18;  Status DC


Morphine Sulfate (Morphine Sulfate) 4 mg 1X  ONCE IV  Last administered on 

9/11/20at 21:00;  Start 9/11/20 at 21:15;  Stop 9/11/20 at 21:16;  Status DC


Morphine Sulfate (Morphine Sulfate) 4 mg PRN Q2HR  PRN IV PAIN Last administered

on 9/12/20at 10:37;  Start 9/12/20 at 10:30


Diphenhydramine HCl (Benadryl) 25 mg 1X  ONCE IM ;  Start 9/12/20 at 12:00;  

Stop 9/12/20 at 12:23;  Status DC


Diphenhydramine HCl (Benadryl) 25 mg 1X  ONCE IVP  Last administered on 

9/12/20at 12:32;  Start 9/12/20 at 12:30;  Stop 9/12/20 at 12:31;  Status DC





Active Scripts


Active


Oxycodone Hcl 20 Mg Tablet 20 Mg PO Q6HRS PRN 5 Days


Reported


Prozac (Fluoxetine Hcl) 40 Mg Capsule 1 Cap PO BID


Trazodone Hcl 100 Mg Tablet 1 Tab PO QHS


D3 + K2 Dots 1,000 Units Tab (Vitamin D3/Vitamin K2) 1 Each Tab.rapdis 1 Tab PO 

DAILY 30 Days


One-Daily Multi-Vitamin (Multivitamin) 1 Each Tablet 1 Tab PO DAILY 30 Days


Children's Aspirin (Aspirin) 81 Mg Tab.chew 1 Tab PO DAILY 30 Days


Hydroxyurea 500 Mg Capsule 500 Mg PO BID





ROS:


Review of Systems


Review of System


REVIEW OF SYSTEMS:


GENERAL:  Denies weakness


SKIN:  No bruising, hair changes or rashes.


EYES:  No blurred, double or loss of vision.


NOSE AND THROAT:  No history of nosebleeds, hoarseness or sore throat.


HEART:  No history of palpitations, chest pain or shortness of breath on


exertion.


LUNGS:  Denies cough, hemoptysis, wheezing or shortness of breath.


GASTROINTESTINAL:  Denies changes in appetite, nausea, vomiting, diarrhea or


constipation.


GENITOURINARY:  No history of frequency, urgency, hesitancy or nocturia.


NEUROLOGIC:  Denies history of numbness, tingling, or tremor.


PSYCHIATRIC:  No history of panic, anxiety or depression.


ENDOCRINE:  No history of heat or cold intolerance, polyuria or polydipsia.


EXTREMITIES:  Denies joint pain, pain on walking or stiffness.





Physical Exam:


Vital Signs:





Vital Signs








  Date Time  Temp Pulse Resp B/P (MAP) Pulse Ox O2 Delivery O2 Flow Rate FiO2


 


9/12/20 11:00 98.3 83 14 92/32 (52) 93 Room Air  





 98.3       


 


9/11/20 22:15       3.0 








Physcial Exam:


GEN:   No apparent distress.  Alert and oriented


HEENT:   Normal cephalic, atraumatic, external auditory canals are patent


EYES:   Extraocular muscles are intact, pupil are equally round and reactive to 

light and accommodation


MUSCULOSKELETAL:  Well developed , well nourished, good range of motion


ENDOCRINE:   No thyromegaly was palpated


LYMPHATICS:   No cervical chain or axillary nodes were noted


HEMATOPOIETIC:  No bruising


NECK:   Supple, no JVD, no thyromegaly was noted


LUNGS:   Clear to auscultation in all lung fields without rhonchi or wheezing


HEART:    RRR, S!, S2 present.  Peripheral pulses intact, no obvious murmurs 

noted


ABDOMEN:   Soft, nontender.  Positive bowel sounds, no organomegaly, normal 

bowel sounds


EXTREMITIES:   Without clubbing, cyanosis, or edema.  Pedal pulses intact.  

Negative Homans sign


NEUROLOGIC:   Normal speech and tone.  A&O x 3, moves all extremities, no obvi

ous focal deficits


PSYCHIATRIC:   Normal affect, normal mood. Stable


SKIN:   No ulcerations or rashes, good skin turgor, no jaundice


VASCULAR:   Good capillary refill, neurovascular bundle appears to be intact





Labs:


Labs:





Laboratory Tests








Test


 9/11/20


19:00


 


White Blood Count


 13.8 x10^3/uL


(4.0-11.0)


 


Red Blood Count


 2.24 x10^6/uL


(3.50-5.70)


 


Hemoglobin


 7.6 g/dL


(12.0-15.5)


 


Hematocrit


 22.6 %


(36.0-47.0)


 


Mean Corpuscular Volume


 101 fL


()


 


Mean Corpuscular Hemoglobin 34 pg (25-35) 


 


Mean Corpuscular Hemoglobin


Concent 34 g/dL


(31-37)


 


Red Cell Distribution Width


 17.7 %


(11.5-14.5)


 


Platelet Count


 227 x10^3/uL


(140-400)


 


Neutrophils (%) (Auto) 72 % (31-73) 


 


Lymphocytes (%) (Auto) 11 % (24-48) 


 


Monocytes (%) (Auto) 14 % (0-9) 


 


Eosinophils (%) (Auto) 2 % (0-3) 


 


Basophils (%) (Auto) 1 % (0-3) 


 


Neutrophils # (Auto)


 10.0 x10^3/uL


(1.8-7.7)


 


Lymphocytes # (Auto)


 1.5 x10^3/uL


(1.0-4.8)


 


Monocytes # (Auto)


 2.0 x10^3/uL


(0.0-1.1)


 


Eosinophils # (Auto)


 0.2 x10^3/uL


(0.0-0.7)


 


Basophils # (Auto)


 0.1 x10^3/uL


(0.0-0.2)


 


Segmented Neutrophils % 72 % (35-66) 


 


Lymphocytes % 13 % (24-48) 


 


Monocytes % 13 % (0-10) 


 


Eosinophils % 2 % (0-5) 


 


Nucleated Red Blood Cells 9 


 


Platelet Estimate


 Adequate


(ADEQUATE)


 


Large Platelets Few 


 


Giant Platelets Occ 


 


Polychromasia Occasional 


 


Poikilocytosis Mod 


 


Anisocytosis Slight 


 


Sickle Cells Mod 


 


Target Cells Mod 


 


Tear Drop Cells Occ 


 


Yaneli Cells Occ 


 


Schistocytes Occ 


 


Absolute Reticulocyte Count


 0.096 x10^6/uL


(0.020-0.120)


 


Percent Reticulocyte Count


 4.3 %


(0.5-2.3)


 


Immature Reticulocyte Fraction


 0.44


(0.20-0.60)


 


Sodium Level


 139 mmol/L


(136-145)


 


Potassium Level


 4.2 mmol/L


(3.5-5.1)


 


Chloride Level


 105 mmol/L


()


 


Carbon Dioxide Level


 24 mmol/L


(21-32)


 


Anion Gap 10 (6-14) 


 


Blood Urea Nitrogen


 16 mg/dL


(7-20)


 


Creatinine


 1.2 mg/dL


(0.6-1.0)


 


Estimated GFR


(Cockcroft-Gault) 61.9 





 


BUN/Creatinine Ratio 13 (6-20) 


 


Glucose Level


 101 mg/dL


(70-99)


 


Calcium Level


 8.7 mg/dL


(8.5-10.1)


 


Total Bilirubin


 1.5 mg/dL


(0.2-1.0)


 


Aspartate Amino Transf


(AST/SGOT) 66 U/L (15-37) 





 


Alanine Aminotransferase


(ALT/SGPT) 37 U/L (14-59) 





 


Alkaline Phosphatase


 138 U/L


()


 


Total Protein


 8.0 g/dL


(6.4-8.2)


 


Albumin


 4.0 g/dL


(3.4-5.0)


 


Albumin/Globulin Ratio 1.0 (1.0-1.7) 








Laboratory Tests








Test


 9/11/20


19:00


 


White Blood Count


 13.8 x10^3/uL


(4.0-11.0)


 


Red Blood Count


 2.24 x10^6/uL


(3.50-5.70)


 


Hemoglobin


 7.6 g/dL


(12.0-15.5)


 


Hematocrit


 22.6 %


(36.0-47.0)


 


Mean Corpuscular Volume


 101 fL


()


 


Mean Corpuscular Hemoglobin 34 pg (25-35) 


 


Mean Corpuscular Hemoglobin


Concent 34 g/dL


(31-37)


 


Red Cell Distribution Width


 17.7 %


(11.5-14.5)


 


Platelet Count


 227 x10^3/uL


(140-400)


 


Neutrophils (%) (Auto) 72 % (31-73) 


 


Lymphocytes (%) (Auto) 11 % (24-48) 


 


Monocytes (%) (Auto) 14 % (0-9) 


 


Eosinophils (%) (Auto) 2 % (0-3) 


 


Basophils (%) (Auto) 1 % (0-3) 


 


Neutrophils # (Auto)


 10.0 x10^3/uL


(1.8-7.7)


 


Lymphocytes # (Auto)


 1.5 x10^3/uL


(1.0-4.8)


 


Monocytes # (Auto)


 2.0 x10^3/uL


(0.0-1.1)


 


Eosinophils # (Auto)


 0.2 x10^3/uL


(0.0-0.7)


 


Basophils # (Auto)


 0.1 x10^3/uL


(0.0-0.2)


 


Segmented Neutrophils % 72 % (35-66) 


 


Lymphocytes % 13 % (24-48) 


 


Monocytes % 13 % (0-10) 


 


Eosinophils % 2 % (0-5) 


 


Nucleated Red Blood Cells 9 


 


Platelet Estimate


 Adequate


(ADEQUATE)


 


Large Platelets Few 


 


Giant Platelets Occ 


 


Polychromasia Occasional 


 


Poikilocytosis Mod 


 


Anisocytosis Slight 


 


Sickle Cells Mod 


 


Target Cells Mod 


 


Tear Drop Cells Occ 


 


Yaneli Cells Occ 


 


Schistocytes Occ 


 


Absolute Reticulocyte Count


 0.096 x10^6/uL


(0.020-0.120)


 


Percent Reticulocyte Count


 4.3 %


(0.5-2.3)


 


Immature Reticulocyte Fraction


 0.44


(0.20-0.60)


 


Sodium Level


 139 mmol/L


(136-145)


 


Potassium Level


 4.2 mmol/L


(3.5-5.1)


 


Chloride Level


 105 mmol/L


()


 


Carbon Dioxide Level


 24 mmol/L


(21-32)


 


Anion Gap 10 (6-14) 


 


Blood Urea Nitrogen


 16 mg/dL


(7-20)


 


Creatinine


 1.2 mg/dL


(0.6-1.0)


 


Estimated GFR


(Cockcroft-Gault) 61.9 





 


BUN/Creatinine Ratio 13 (6-20) 


 


Glucose Level


 101 mg/dL


(70-99)


 


Calcium Level


 8.7 mg/dL


(8.5-10.1)


 


Total Bilirubin


 1.5 mg/dL


(0.2-1.0)


 


Aspartate Amino Transf


(AST/SGOT) 66 U/L (15-37) 





 


Alanine Aminotransferase


(ALT/SGPT) 37 U/L (14-59) 





 


Alkaline Phosphatase


 138 U/L


()


 


Total Protein


 8.0 g/dL


(6.4-8.2)


 


Albumin


 4.0 g/dL


(3.4-5.0)


 


Albumin/Globulin Ratio 1.0 (1.0-1.7) 











Images:


Images


No images to review at this time





Assessment/Plan


Assessment/Plan


Bilateral extremity pain concerning for sickle cell anemia flare


Macrocytic anemia, leukocytosis


GLORY due to vasomotor nephropathy


Hyperbilirubinemia


Mild transaminitis








Admit to medicine for further management


Hematology consult


Continue IV fluids


Continue IV pain control


Continue home medications


Lovenox for DVT prophylaxis


Protonix GI prophylaxis


ADA diet


Full code


Discussed with RN and SW


Disposition pending hematology evaluation


Surrogate decision maker is undesignated





Justifications for Admission


Other Justification














VASYL MORALES MD                    Sep 12, 2020 12:46

## 2020-09-13 VITALS — DIASTOLIC BLOOD PRESSURE: 48 MMHG | SYSTOLIC BLOOD PRESSURE: 95 MMHG

## 2020-09-13 VITALS — SYSTOLIC BLOOD PRESSURE: 103 MMHG | DIASTOLIC BLOOD PRESSURE: 57 MMHG

## 2020-09-13 VITALS — SYSTOLIC BLOOD PRESSURE: 91 MMHG | DIASTOLIC BLOOD PRESSURE: 50 MMHG

## 2020-09-13 VITALS — DIASTOLIC BLOOD PRESSURE: 46 MMHG | SYSTOLIC BLOOD PRESSURE: 98 MMHG

## 2020-09-13 VITALS — SYSTOLIC BLOOD PRESSURE: 104 MMHG | DIASTOLIC BLOOD PRESSURE: 59 MMHG

## 2020-09-13 VITALS — SYSTOLIC BLOOD PRESSURE: 92 MMHG | DIASTOLIC BLOOD PRESSURE: 49 MMHG

## 2020-09-13 RX ADMIN — MULTIPLE VITAMINS W/ MINERALS TAB SCH TAB: TAB at 09:29

## 2020-09-13 RX ADMIN — BACITRACIN SCH MLS/HR: 5000 INJECTION, POWDER, FOR SOLUTION INTRAMUSCULAR at 09:53

## 2020-09-13 RX ADMIN — HYDROXYUREA SCH MG: 500 CAPSULE ORAL at 09:53

## 2020-09-13 RX ADMIN — MORPHINE SULFATE PRN MG: 2 INJECTION, SOLUTION INTRAMUSCULAR; INTRAVENOUS at 10:01

## 2020-09-13 RX ADMIN — VITAMIN D, TAB 1000IU (100/BT) SCH UNIT: 25 TAB at 09:30

## 2020-09-13 RX ADMIN — HYDROXYUREA SCH MG: 500 CAPSULE ORAL at 21:15

## 2020-09-13 RX ADMIN — MORPHINE SULFATE PRN MLS/HR: 1 INJECTION INTRAVENOUS at 10:41

## 2020-09-13 RX ADMIN — BACITRACIN SCH MLS/HR: 5000 INJECTION, POWDER, FOR SOLUTION INTRAMUSCULAR at 05:31

## 2020-09-13 RX ADMIN — ASPIRIN 81 MG SCH MG: 81 TABLET ORAL at 09:30

## 2020-09-13 RX ADMIN — BACITRACIN SCH MLS/HR: 5000 INJECTION, POWDER, FOR SOLUTION INTRAMUSCULAR at 21:09

## 2020-09-13 RX ADMIN — TRAZODONE HYDROCHLORIDE SCH MG: 100 TABLET ORAL at 21:10

## 2020-09-13 RX ADMIN — MORPHINE SULFATE PRN MG: 2 INJECTION, SOLUTION INTRAMUSCULAR; INTRAVENOUS at 07:41

## 2020-09-13 RX ADMIN — PANTOPRAZOLE SODIUM SCH MG: 40 TABLET, DELAYED RELEASE ORAL at 09:30

## 2020-09-13 RX ADMIN — ENOXAPARIN SODIUM SCH MG: 40 INJECTION SUBCUTANEOUS at 21:08

## 2020-09-13 RX ADMIN — MORPHINE SULFATE PRN MG: 2 INJECTION, SOLUTION INTRAMUSCULAR; INTRAVENOUS at 05:32

## 2020-09-13 RX ADMIN — BACITRACIN SCH MLS/HR: 5000 INJECTION, POWDER, FOR SOLUTION INTRAMUSCULAR at 14:00

## 2020-09-13 NOTE — NUR
This Rn walked into patient's room to administer Am meds. Patient proceeded to tell this RN 
that her Iv fluids had become undone. This RN inspected IV line and discovered that Iv 
tubing had been disconnected from patient, unknown if another RN had unhooked patient from 
IV fluids. Changed tubing and connected patient back to fluids. Will continue to monitor and 
care for according to plan of care.

## 2020-09-13 NOTE — PDOC
[facility] [facility] PROGRESS NOTE


Date of Service


DOS:


DATE: 9/13/20 


TIME: 11:56





Chief Complaint


Chief Complaint


Uncontrolled bilateral upper and lower extremity pain concerning for sickle cell

anemia flare


Macrocytic anemia, leukocytosis


GLORY due to vasomotor nephropathy


Hyperbilirubinemia


Mild transaminitis


Drug-seeking behavior


Potential narcotic abuse








Admit to medicine for further management


Hematology consult


Continue IV fluids


Continue IV pain control


Continue home medications


Lovenox for DVT prophylaxis


Protonix GI prophylaxis


ADA diet


Full code


Discussed with RN and SW


Disposition pending hematology evaluation


Surrogate decision maker is undesignated





History of Present Illness


History of Present Illness


 35  year old -American female who presents to the emergency department 

with request for pain medication.  Patient states that she is having a flareup 

of her sickle cell pain.  She has been taking her pain medication at home with 

no relief of her symptoms.  She states that this flareup began 3 days ago.  She 

states that the only medication that works for her sickle cell episodes is 

morphine, alternated with Dilaudid and Benadryl.  She denies any fever, cough, 

shortness of breath, abdominal pain, nausea, vomiting, or diarrhea.  The patient

currently rates her pain a 10 out of 10 on pain scale, she denies any 

alleviating or exacerbating factors.  She reports that the pain is located 

everywhere.  Patient is very short with her responses therefore the HPI is 

limited.








At the time of my evaluation, patient continues to have the same presentation of

her symptoms.  She describes her bilateral leg and hand of throbbing pain.  She 

feels like it she is having cramps in her hands or legs.  Denies abdominal pain,

shortness of breath, chest pain, bloody stools, jaundice, swelling.








9/13/2020


No acute events overnight.  Patient seen and examined bedside.  There is no 

changes in her pain symptoms.  In the early morning it was reported by the nurse

Charo that she had observed the patient potentially pocketing her oxycodone.  

For this reason patient was switched to IV PCA so that she has more control of 

her medication delivery and there is no risk for her once she is discharged for 

overdosing on narcotics.  The patient was not confronted with this issue at this

time.  Patient's chart, labs, images were reviewed and discussed with RN





Vitals/I&O


Vitals/I&O:





                                   Vital Signs








  Date Time  Temp Pulse Resp B/P (MAP) Pulse Ox O2 Delivery O2 Flow Rate FiO2


 


9/13/20 11:12      Room Air  


 


9/13/20 11:00 98.2 83 16 98/46 (63) 93   





 98.2       


 


9/13/20 03:00       4.0 














                                    I & O   


 


 9/12/20 9/12/20 9/13/20





 14:59 22:59 06:59


 


Intake Total  240 ml 360 ml


 


Balance  240 ml 360 ml











Physical Exam


Physical Exam:


GEN:   No apparent distress.  Alert and oriented


HEENT:   Normal cephalic, atraumatic, external auditory canals are patent


NECK:   Supple, no JVD, no thyromegaly was noted


LUNGS:   Bilateral crackles


HEART:    RRR, S1, S2 present.  Peripheral pulses intact, no obvious murmurs 

noted


ABDOMEN:     Soft, nontender.  Positive bowel sounds, no organomegaly, normal 

bowel sounds


EXTREMITIES:  Without clubbing, cyanosis, or edema.  Pedal pulses intact.  

Negative Homans sign





Assessment and Plan


Assessmemt and Plan


Problems


Medical Problems:


(1) Intractable pain


Status: Acute  











Comment


Review of Relevant


I have reviewed the following items ruslan (where applicable) has been applied.


Medications:





Current Medications








 Medications


  (Trade)  Dose


 Ordered  Sig/Caren


 Route


 PRN Reason  Start Time


 Stop Time Status Last Admin


Dose Admin


 


 Diphenhydramine


 HCl


  (Benadryl)  25 mg  1X  ONCE


 IVP


   9/12/20 12:30


 9/12/20 12:31 DC 9/12/20 12:32





 


 Pantoprazole


 Sodium


  (Protonix)  40 mg  DAILYAC


 PO


   9/13/20 07:30


    9/13/20 09:30





 


 Enoxaparin Sodium


  (Lovenox 40mg


 Syringe)  40 mg  Q24H


 SQ


   9/12/20 21:00


    9/12/20 20:43





 


 Diphenhydramine


 HCl


  (Benadryl)  25 mg  1X  ONCE


 IVP


   9/12/20 15:00


 9/12/20 15:01 DC 9/12/20 14:57





 


 Hydroxyzine HCl


  (Atarax)  10 mg  PRN Q6HRS  PRN


 PO


 ITCHING 2ND CHOICE  9/13/20 09:00


    9/13/20 10:41





 


 Diphenhydramine


 HCl


  (Benadryl)  25 mg  PRN Q4HRS  PRN


 PO


 ITCHING 1ST CHOICE  9/13/20 09:00


    9/13/20 09:29





 


 Morphine Sulfate  30 ml @ 0


 mls/hr  CONT PRN  PRN


 IV


 PER PROTOCOL  9/13/20 10:00


    9/13/20 10:41














Justifications for Admission


Other Justification














VASYL MORALES MD                    Sep 13, 2020 11:59

## 2020-09-13 NOTE — NUR
Concerns for drug seeking behavior. This morning as patient was being given her am 
medications this RN was observing patient as she took her pain medication. As patient got to 
her pain medication she grasped them in a hand and closed it in a fist and from what this RN 
was observing patient seemed to be pocketing pills in her hand, pretending to take the pills 
from the bottom side of her hand where this RN could not see clearly. Patient them laid down 
after this and this RN proceeded to do patient assessment. As this RN did assessment patient 
did not open her hand in which she had grabbed the pills with. Reported behavior to Dr. Smith. 
Will continue to monitor and care for according to poc.

## 2020-09-14 VITALS — DIASTOLIC BLOOD PRESSURE: 71 MMHG | SYSTOLIC BLOOD PRESSURE: 115 MMHG

## 2020-09-14 VITALS — DIASTOLIC BLOOD PRESSURE: 54 MMHG | SYSTOLIC BLOOD PRESSURE: 96 MMHG

## 2020-09-14 VITALS — DIASTOLIC BLOOD PRESSURE: 58 MMHG | SYSTOLIC BLOOD PRESSURE: 99 MMHG

## 2020-09-14 VITALS — DIASTOLIC BLOOD PRESSURE: 64 MMHG | SYSTOLIC BLOOD PRESSURE: 113 MMHG

## 2020-09-14 VITALS — SYSTOLIC BLOOD PRESSURE: 101 MMHG | DIASTOLIC BLOOD PRESSURE: 62 MMHG

## 2020-09-14 VITALS — SYSTOLIC BLOOD PRESSURE: 121 MMHG | DIASTOLIC BLOOD PRESSURE: 67 MMHG

## 2020-09-14 LAB
AMPHETAMINE/METHAMPHETAMINE: (no result)
BARBITURATES UR-MCNC: (no result) UG/ML
BENZODIAZ UR-MCNC: (no result) UG/L
CANNABINOIDS UR-MCNC: (no result) UG/L
COCAINE UR-MCNC: (no result) NG/ML
METHADONE SERPL-MCNC: (no result) NG/ML
OPIATES UR-MCNC: (no result) NG/ML
PCP SERPL-MCNC: (no result) MG/DL

## 2020-09-14 RX ADMIN — MORPHINE SULFATE PRN MLS/HR: 1 INJECTION INTRAVENOUS at 11:46

## 2020-09-14 RX ADMIN — VITAMIN D, TAB 1000IU (100/BT) SCH UNIT: 25 TAB at 09:00

## 2020-09-14 RX ADMIN — PANTOPRAZOLE SODIUM SCH MG: 40 TABLET, DELAYED RELEASE ORAL at 07:30

## 2020-09-14 RX ADMIN — BACITRACIN SCH MLS/HR: 5000 INJECTION, POWDER, FOR SOLUTION INTRAMUSCULAR at 22:17

## 2020-09-14 RX ADMIN — MULTIPLE VITAMINS W/ MINERALS TAB SCH TAB: TAB at 09:00

## 2020-09-14 RX ADMIN — BACITRACIN SCH MLS/HR: 5000 INJECTION, POWDER, FOR SOLUTION INTRAMUSCULAR at 20:39

## 2020-09-14 RX ADMIN — TRAZODONE HYDROCHLORIDE SCH MG: 100 TABLET ORAL at 20:40

## 2020-09-14 RX ADMIN — HYDROXYUREA SCH MG: 500 CAPSULE ORAL at 09:00

## 2020-09-14 RX ADMIN — BACITRACIN SCH MLS/HR: 5000 INJECTION, POWDER, FOR SOLUTION INTRAMUSCULAR at 20:38

## 2020-09-14 RX ADMIN — HYDROXYUREA SCH MG: 500 CAPSULE ORAL at 20:45

## 2020-09-14 RX ADMIN — ASPIRIN 81 MG SCH MG: 81 TABLET ORAL at 09:00

## 2020-09-14 RX ADMIN — ENOXAPARIN SODIUM SCH MG: 40 INJECTION SUBCUTANEOUS at 20:39

## 2020-09-14 RX ADMIN — DOCUSATE SODIUM SCH MG: 100 CAPSULE, LIQUID FILLED ORAL at 20:40

## 2020-09-14 NOTE — PDOC
[employer] [facility] PROGRESS NOTE


Date of Service


DOS:


DATE: 9/14/20 


TIME: 17:37





Chief Complaint


Chief Complaint


Uncontrolled bilateral upper and lower extremity pain concerning for sickle cell

anemia flare


Macrocytic anemia, leukocytosis


GLORY due to vasomotor nephropathy


Hyperbilirubinemia


Mild transaminitis


Drug-seeking behavior


Potential narcotic abuse








9/14/2020


Patient evaluated bedside, resting comfortably.  She reports pain all over but 

cannot be more specific.  She is asking for an increase in her pain medication, 

unable to tell me her exact home regimen.  Continue with IV hydration.  

Discussed with RN, discussed with hematology.





History of Present Illness


History of Present Illness


 35  year old -American female who presents to the emergency department 

with request for pain medication.  Patient states that she is having a flareup 

of her sickle cell pain.  She has been taking her pain medication at home with 

no relief of her symptoms.  She states that this flareup began 3 days ago.  She 

states that the only medication that works for her sickle cell episodes is 

morphine, alternated with Dilaudid and Benadryl.  She denies any fever, cough, 

shortness of breath, abdominal pain, nausea, vomiting, or diarrhea.  The patient

currently rates her pain a 10 out of 10 on pain scale, she denies any 

alleviating or exacerbating factors.  She reports that the pain is located 

everywhere.  Patient is very short with her responses therefore the HPI is 

limited.








At the time of my evaluation, patient continues to have the same presentation of

her symptoms.  She describes her bilateral leg and hand of throbbing pain.  She 

feels like it she is having cramps in her hands or legs.  Denies abdominal pain,

shortness of breath, chest pain, bloody stools, jaundice, swelling.








9/13/2020


No acute events overnight.  Patient seen and examined bedside.  There is no 

changes in her pain symptoms.  In the early morning it was reported by the nurse

Charo that she had observed the patient potentially pocketing her oxycodone.  

For this reason patient was switched to IV PCA so that she has more control of 

her medication delivery and there is no risk for her once she is discharged for 

overdosing on narcotics.  The patient was not confronted with this issue at this

time.  Patient's chart, labs, images were reviewed and discussed with RN





Vitals/I&O


Vitals/I&O:





                                   Vital Signs








  Date Time  Temp Pulse Resp B/P (MAP) Pulse Ox O2 Delivery O2 Flow Rate FiO2


 


9/14/20 14:41 98.3 73 20 101/62 (75) 93 Room Air  





 98.3       














                                    I & O   


 


 9/13/20 9/13/20 9/14/20





 15:00 23:00 07:00


 


Intake Total  240 ml 360 ml


 


Balance  240 ml 360 ml











Physical Exam


Physical Exam:


GEN:   No apparent distress.  Alert and oriented


HEENT:   Normal cephalic, atraumatic, external auditory canals are patent


NECK:   Supple, no JVD, no thyromegaly was noted


LUNGS:   Bilateral crackles


HEART:    RRR, S1, S2 present.  Peripheral pulses intact, no obvious murmurs 

noted


ABDOMEN:     Soft, nontender.  Positive bowel sounds, no organomegaly, normal 

bowel sounds


EXTREMITIES:  Without clubbing, cyanosis, or edema.  Pedal pulses intact.  Negat

ana Homans sign


General:  Alert, Oriented X3


Heart:  Normal S1


Abdomen:  Normal bowel sounds


Extremities:  No clubbing


Skin:  No rashes





Labs


Labs:





Laboratory Tests








Test


 9/14/20


08:05 9/14/20


09:35


 


Iron Level


 169 ug/dL


() 





 


Total Iron Binding Capacity


 174 ug/dL


(250-450) 





 


Iron Saturation 97 % (15-34)  


 


Lactate Dehydrogenase


 528 U/L


() 





 


Vitamin B12 Level


 382 pg/mL


(247-911) 





 


Urine Opiates Screen  Pos (NEG) 


 


Urine Methadone Screen  Neg (NEG) 


 


Urine Barbiturates  Neg (NEG) 


 


Urine Phencyclidine Screen  Neg (NEG) 


 


Urine


Amphetamine/Methamphetamine 


 Neg (NEG) 





 


Urine Benzodiazepines Screen  Neg (NEG) 


 


Urine Cocaine Screen  Pos (NEG) 


 


Urine Cannabinoids Screen  Neg (NEG) 


 


Urine Ethyl Alcohol  Neg (NEG) 











Assessment and Plan


Assessmemt and Plan


Problems


Medical Problems:


(1) Intractable pain


Status: Acute  











Comment


Review of Relevant


I have reviewed the following items ruslan (where applicable) has been applied.





Justifications for Admission


Other Justification














ARIC CARLOS MD            Sep 14, 2020 17:39

## 2020-09-14 NOTE — PDOC2
CONSULT


Date of Consult


Date of Consult


DATE: 9/14/20 


TIME: 14:40





Reason for Consult


Reason for Consult:


Sickle cell disease with vaso-occlusive pain crisis





Referring Physician


Referring Physician:


Dr. Smith





Identification/Chief Complaint


Chief Complaint


Bone pains





Source


Source:  Chart review, Patient





History of Present Illness


Reason for Visit:


Selena Porter is a 35-year-old -American female with history of sickle 

cell disease who has been admitted with vaso-occlusive pain crisis.  Patient 

reports worsening of her sickle cell pain in recent days.  Patient reports that 

she normally takes oxycodone 30 mg every 4 hours as needed for pain for 

outpatient management of her sickle cell disease related chronic pain.  She 

notes that she follows with physician at [facility]'s sickle cell 

clinic.  She does not recall their name.  She reports taking 2000 mg of 

hydroxyurea daily.  She notes having sickle cell exacerbations once to twice a 

month.  She has previously been hospitalized at [facility] for this. 

She informed me that she is scheduled to begin exchange transfusions later this 

month.  She has a prior history of acute chest syndrome and stroke related to 

sickle cell disease.





She denies any recent fever, chills, chest pain, shortness of breath, nausea, 

vomiting.


She denies any sick contacts


She denies any new onset weakness or numbness





Past Surgical History


Past Surgical History:  Total hip replacement





Family History


Family History:  No Significant, Diabetes





Social History


ALCOHOL:  none


Drugs:  None





Current Problem List


Problem List


Problems


Medical Problems:


(1) Intractable pain


Status: Acute  











Current Medications


Current Medications





Current Medications


Diphenhydramine HCl (Benadryl) 25 mg 1X  ONCE IVP  Last administered on 

9/11/20at 19:23;  Start 9/11/20 at 19:30;  Stop 9/11/20 at 19:31;  Status DC


Morphine Sulfate (Morphine Sulfate) 4 mg 1X  ONCE IV  Last administered on 

9/11/20at 19:24;  Start 9/11/20 at 19:30;  Stop 9/11/20 at 19:31;  Status DC


Sodium Chloride 1,000 ml @  1,000 mls/hr 1X  ONCE IV  Last administered on 

9/11/20at 19:29;  Start 9/11/20 at 19:30;  Stop 9/11/20 at 20:29;  Status DC


Hydromorphone HCl (Dilaudid) 1 mg 1X  ONCE IVP  Last administered on 9/11/20at 

20:10;  Start 9/11/20 at 20:30;  Stop 9/11/20 at 20:31;  Status DC


Hydromorphone HCl (Dilaudid) 2 mg STK-MED ONCE .ROUTE ;  Start 9/11/20 at 20:06;

 Stop 9/11/20 at 20:07;  Status DC


Sodium Chloride 1,000 ml @  100 mls/hr Q10H IV ;  Start 9/11/20 at 21:00;  Stop 

9/12/20 at 20:59;  Status Cancel


Sodium Chloride 1,000 ml @  100 mls/hr Q10H IV  Last administered on 9/13/20at 

21:09;  Start 9/11/20 at 22:00


Ondansetron HCl (Zofran) 4 mg PRN Q4HRS  PRN IV NAUSEA/VOMITING;  Start 9/11/20 

at 20:45


Zolpidem Tartrate (Ambien) 5 mg PRN QHS  PRN PO INSOMNIA Last administered on 

9/13/20at 22:01;  Start 9/11/20 at 20:45


Acetaminophen (Tylenol) 650 mg PRN Q4HRS  PRN PO TEMP OVER 100.4F Last 

administered on 9/14/20at 11:47;  Start 9/11/20 at 20:45


Clonidine HCl (Catapres) 0.1 mg PRN Q6HRS  PRN PO SBP>160 OR DBP>90;  Start 

9/11/20 at 20:45


Diphenhydramine HCl (Benadryl) 25 mg PRN Q4HRS  PRN IVP ITCHING Last 

administered on 9/13/20at 05:33;  Start 9/11/20 at 20:45;  Stop 9/13/20 at 08:5

4;  Status DC


Docusate Sodium (Colace) 100 mg PRN BID  PRN PO HARD STOOLS;  Start 9/11/20 at 

20:45


Albuterol Sulfate (Ventolin Neb Soln) 2.5 mg PRN Q4HRS  PRN NEB SHORTNESS OF 

BREATH;  Start 9/11/20 at 20:45


Guaifenesin (Robitussin) 200 mg PRN Q4HRS  PRN PO COUGH Last administered on 

9/11/20at 22:59;  Start 9/11/20 at 20:45


Lorazepam (Ativan) 0.5 mg PRN Q4HRS  PRN PO ANXIETY / AGITATION Last 

administered on 9/13/20at 22:01;  Start 9/11/20 at 20:45


Aspirin (Aspirin Chewable) 81 mg DAILY PO  Last administered on 9/14/20at 09:00;

 Start 9/12/20 at 09:00


Hydroxyurea (Hydrea) 500 mg BID PO  Last administered on 9/14/20at 09:00;  Start

9/11/20 at 21:00


Trazodone HCl (Desyrel) 100 mg QHS PO  Last administered on 9/13/20at 21:10;  

Start 9/11/20 at 21:00


Fluoxetine HCl (PROzac) 40 mg BID PO  Last administered on 9/14/20at 09:00;  

Start 9/11/20 at 21:00


Multivitamins (Thera M Plus) 1 tab DAILY PO  Last administered on 9/14/20at 

09:00;  Start 9/12/20 at 09:00


Oxycodone HCl (Roxicodone) 20 mg PRN Q6HRS  PRN PO PAIN Last administered on 

9/13/20at 09:30;  Start 9/11/20 at 21:00;  Stop 9/13/20 at 10:09;  Status DC


Vitamin D (Vitamin D3) 1,000 unit DAILY PO  Last administered on 9/14/20at 

09:00;  Start 9/12/20 at 09:00


Fentanyl Citrate (Fentanyl 2ml Vial) 25 mcg PRN Q3HRS  PRN IV PAIN;  Start 

9/11/20 at 20:45;  Stop 9/11/20 at 23:18;  Status DC


Morphine Sulfate (Morphine Sulfate) 4 mg 1X  ONCE IV  Last administered on 

9/11/20at 21:00;  Start 9/11/20 at 21:15;  Stop 9/11/20 at 21:16;  Status DC


Morphine Sulfate (Morphine Sulfate) 4 mg PRN Q2HR  PRN IV PAIN Last administered

on 9/13/20at 10:01;  Start 9/12/20 at 10:30;  Stop 9/13/20 at 10:19;  Status DC


Diphenhydramine HCl (Benadryl) 25 mg 1X  ONCE IM ;  Start 9/12/20 at 12:00;  

Stop 9/12/20 at 12:23;  Status DC


Diphenhydramine HCl (Benadryl) 25 mg 1X  ONCE IVP  Last administered on 

9/12/20at 12:32;  Start 9/12/20 at 12:30;  Stop 9/12/20 at 12:31;  Status DC


Enoxaparin Sodium (Lovenox 40mg Syringe) 40 mg Q24H SQ ;  Start 9/12/20 at 

13:00;  Stop 9/12/20 at 14:30;  Status DC


Pantoprazole Sodium (Protonix) 40 mg DAILYAC PO  Last administered on 9/14/20at 

07:30;  Start 9/13/20 at 07:30


Enoxaparin Sodium (Lovenox 40mg Syringe) 40 mg Q24H SQ  Last administered on 

9/13/20at 21:08;  Start 9/12/20 at 21:00


Diphenhydramine HCl (Benadryl) 25 mg 1X  ONCE IVP  Last administered on 

9/12/20at 14:57;  Start 9/12/20 at 15:00;  Stop 9/12/20 at 15:01;  Status DC


Hydroxyzine HCl (Atarax) 10 mg PRN Q6HRS  PRN PO ITCHING 2ND CHOICE Last 

administered on 9/13/20at 10:41;  Start 9/13/20 at 09:00;  Stop 9/13/20 at 

14:52;  Status DC


Diphenhydramine HCl (Benadryl) 25 mg PRN Q4HRS  PRN PO ITCHING 1ST CHOICE Last 

administered on 9/13/20at 09:29;  Start 9/13/20 at 09:00;  Stop 9/13/20 at 

14:52;  Status DC


Naloxone HCl (Narcan) 0.4 mg PRN Q2MIN  PRN IV SEE INSTRUCTIONS;  Start 9/13/20 

at 10:00


Sodium Chloride 1,000 ml @  25 mls/hr Q24H IV ;  Start 9/13/20 at 09:53


Morphine Sulfate 30 ml @ 0 mls/hr CONT PRN  PRN IV PER PROTOCOL Last 

administered on 9/14/20at 11:46;  Start 9/13/20 at 10:00


Hydroxyzine HCl (Vistaril Im) 25 mg 1X  ONCE IM  Last administered on 9/13/20at 

12:29;  Start 9/13/20 at 11:45;  Stop 9/13/20 at 11:46;  Status DC


Diphenhydramine HCl (Benadryl) 50 mg PRN Q6HRS  ONCE IVP ;  Start 9/13/20 at 

14:45;  Stop 9/13/20 at 14:46;  Status Cancel


Diphenhydramine HCl (Benadryl) 50 mg PRN Q6HRS  PRN IVP ITCHING Last 

administered on 9/14/20at 10:04;  Start 9/13/20 at 15:00





Active Scripts


Active


Oxycodone Hcl 20 Mg Tablet 20 Mg PO Q6HRS PRN 5 Days


Reported


Prozac (Fluoxetine Hcl) 40 Mg Capsule 1 Cap PO BID


Trazodone Hcl 100 Mg Tablet 1 Tab PO QHS


D3 + K2 Dots 1,000 Units Tab (Vitamin D3/Vitamin K2) 1 Each Tab.rapdis 1 Tab PO 

DAILY 30 Days


One-Daily Multi-Vitamin (Multivitamin) 1 Each Tablet 1 Tab PO DAILY 30 Days


Children's Aspirin (Aspirin) 81 Mg Tab.chew 1 Tab PO DAILY 30 Days


Hydroxyurea 500 Mg Capsule 500 Mg PO BID





Allergies


Allergies:  


Coded Allergies:  


     fentanyl (Verified  Allergy, Severe, Swelling, 9/11/20)


   


   "tongue swells up" "hives"


     latex (Verified  Allergy, Intermediate, RIPS SKIN OFF, 9/11/20)


     adhesive tape (Verified  Allergy, Mild, 9/11/20)





ROS


General:  No: Chills, Night Sweats


PSYCHOLOGICAL ROS:  No: Anxiety, Behavioral Disorder


Eyes:  No Blurry vision, No Decreased vision


HEENT:  No: Heacaches, Visual Changes


ALLERGY AND IMMUNOLOGY:  No: Nasal Congestion, Post Nasal Drip


Hematological and Lymphatic:  No: Brusing, Night Sweats


ENDOCRINE:  YES: Malaise/lethargy; 


   No: Mood Swings


Respiratory:  No: Cough, Hemoptysis


Cardiovascular:  No Chest Pain, No Palpitations


Gastrointestinal:  No Vomiting


Genitourinary:  No Dysuria


Musculoskeletal:  Yes Joint Pain


Neurological:  No Dizziness


Skin:  No Dry Skin





Physical Exam


General:  Alert, Oriented X3


HEENT:  Atraumatic


Lungs:  Clear to auscultation


Heart:  Normal S1


Abdomen:  Normal bowel sounds


Extremities:  No clubbing


Skin:  No rashes


Neuro:  Normal gait


Psych/Mental Status:  Mental status NL


MUSCULOSKELETAL:  No joint tenderness





Vitals


VITALS





Vital Signs








  Date Time  Temp Pulse Resp B/P (MAP) Pulse Ox O2 Delivery O2 Flow Rate FiO2


 


9/14/20 11:46   19  94 Room Air  


 


9/14/20 11:00 98.7 80  121/67 (85)    





 98.7       











Labs


Labs





Laboratory Tests








Test


 9/14/20


08:05 9/14/20


09:35


 


Iron Level


 169 ug/dL


() 





 


Total Iron Binding Capacity


 174 ug/dL


(250-450) 





 


Iron Saturation 97 % (15-34)  


 


Lactate Dehydrogenase


 528 U/L


() 





 


Vitamin B12 Level


 382 pg/mL


(247-911) 





 


Urine Opiates Screen  Pos (NEG) 


 


Urine Methadone Screen  Neg (NEG) 


 


Urine Barbiturates  Neg (NEG) 


 


Urine Phencyclidine Screen  Neg (NEG) 


 


Urine


Amphetamine/Methamphetamine 


 Neg (NEG) 





 


Urine Benzodiazepines Screen  Neg (NEG) 


 


Urine Cocaine Screen  Pos (NEG) 


 


Urine Cannabinoids Screen  Neg (NEG) 


 


Urine Ethyl Alcohol  Neg (NEG) 








Laboratory Tests








Test


 9/14/20


08:05 9/14/20


09:35


 


Iron Level


 169 ug/dL


() 





 


Total Iron Binding Capacity


 174 ug/dL


(250-450) 





 


Iron Saturation 97 % (15-34)  


 


Lactate Dehydrogenase


 528 U/L


() 





 


Vitamin B12 Level


 382 pg/mL


(247-911) 





 


Urine Opiates Screen  Pos (NEG) 


 


Urine Methadone Screen  Neg (NEG) 


 


Urine Barbiturates  Neg (NEG) 


 


Urine Phencyclidine Screen  Neg (NEG) 


 


Urine


Amphetamine/Methamphetamine 


 Neg (NEG) 





 


Urine Benzodiazepines Screen  Neg (NEG) 


 


Urine Cocaine Screen  Pos (NEG) 


 


Urine Cannabinoids Screen  Neg (NEG) 


 


Urine Ethyl Alcohol  Neg (NEG) 











Assessment/Plan


Assessment/Plan


Assessment


Sickle cell disease, with vaso-occlusive pain crisis


Acute on chronic pain


Macrocytosis, secondary to hydroxyurea usage





Recommendations:


-I recommended and check B12, LDH, reticulocyte count and iron studies.  

Consistent with sickle cell disease with acute vaso-occlusive crisis


-Continue with pain management for sickle cell vaso-occlusive pain crisis


-Continue with IV fluids


-Continue with supplemental oxygen as needed


-Encourage patient to increase physical activity as tolerated


-Recommend bowel regimen given opiate use


-Continue with outpatient follow-up with [facility].  She is 

planning on being exchange transfusion soon.


-Can continue hydroxyurea at home dosage while inpatient





Sahil Heaton MD


Medical Oncology/Hematology


Ph: [phone]











NEGRITA HEATON MD       Sep 14, 2020 14:53

## 2020-09-14 NOTE — NUR
SW following. Spoke with RN and reviewed chart. Pt from home and currently on IV pain 
medications, room air, and a regular diet. Pt will likely discharge home today. No SW needs 
identified for discharge.

## 2020-09-15 VITALS — DIASTOLIC BLOOD PRESSURE: 62 MMHG | SYSTOLIC BLOOD PRESSURE: 98 MMHG

## 2020-09-15 VITALS — SYSTOLIC BLOOD PRESSURE: 131 MMHG | DIASTOLIC BLOOD PRESSURE: 56 MMHG

## 2020-09-15 VITALS — SYSTOLIC BLOOD PRESSURE: 108 MMHG | DIASTOLIC BLOOD PRESSURE: 66 MMHG

## 2020-09-15 RX ADMIN — BACITRACIN SCH MLS/HR: 5000 INJECTION, POWDER, FOR SOLUTION INTRAMUSCULAR at 04:01

## 2020-09-15 RX ADMIN — ASPIRIN 81 MG SCH MG: 81 TABLET ORAL at 10:36

## 2020-09-15 RX ADMIN — MORPHINE SULFATE PRN MLS/HR: 1 INJECTION INTRAVENOUS at 01:18

## 2020-09-15 RX ADMIN — MULTIPLE VITAMINS W/ MINERALS TAB SCH TAB: TAB at 10:36

## 2020-09-15 RX ADMIN — DOCUSATE SODIUM SCH MG: 100 CAPSULE, LIQUID FILLED ORAL at 10:37

## 2020-09-15 RX ADMIN — PANTOPRAZOLE SODIUM SCH MG: 40 TABLET, DELAYED RELEASE ORAL at 10:37

## 2020-09-15 RX ADMIN — VITAMIN D, TAB 1000IU (100/BT) SCH UNIT: 25 TAB at 10:36

## 2020-09-15 RX ADMIN — HYDROXYUREA SCH MG: 500 CAPSULE ORAL at 10:40

## 2020-09-15 NOTE — NUR
Around 0615 the supervisor and I both saw a visitor I the pts. room outside of visiting 
hours sitting in the chair. The supervisor asked him how long has he been there and he said 
since 6:30 pm. Then she asked him where was he at and he said that he had been sleeping on 
the floor. Both the supervisor and I and several other nurses had been in and out of her 
room and we had not seen him all night. When I went to start a new IV I did see blankets and 
a pillow on the floor. i thought she had put them there b/c she was too cold. Earlier that 
night she asked me what her urine test showed and I told her it said it was + for Opiates 
and cocaine. She said that she doesn't know how it got there and that "They put anything in 
your drink."It is suspected that visitors may be bringing it in to her so I called Dr. John and he said no visitors. He did not want a repeat UA b/c it would still show the same 
thing. I notified security about having no visitors.

## 2020-09-15 NOTE — NUR
Nurse assigned called to ask for assistance starting another IV on this patient. Nsg Sup 
attempted x 2 and was unsuccessful. Pt was difficult to arouse and would not answer any 
questions, just turn her head to the left side and close her eyes. RN assigned was updated. 
When returning to the room, Nsg Sup found a visitor who stated he was her friend sitting in 
the lounge chair in the corner of the patient's room.  He stated he had been here all night 
since 6:30 PM. Nurse assigned stated she had not seen him all night on the unit. He was 
escorted by the Nsg Sup to the ER waiting room to await appropriate visiting hours. He was 
updated on the need to obtain permission to stay overnight in a patient's room.

## 2020-09-15 NOTE — NUR
SW following. Spoke with RN and reviewed chart. SW consulted for frequent admissions. Pt 
left AMA today. No further SW needs.

## 2020-09-15 NOTE — NUR
PATIENT ALERT AND VERBALLY RESPONSIVE WITH COMPLAINTS OF PAIN AND WANTS TO HAVE HER IV 
RESTARTED DISPITE SEVERAL UNSUCCESSFUL ATTEMPTS AT RESTARTING HER IV DURING THE NIGHT, THIS 
WRITER INFORMED THE PATIENT THAT I WOULD NOTIFY THE MD TO SEE IF HE WOULD ORDER PO. PAIN 
MEDICATIONS, PATIENT STATES "I NEED MY IV RESTARTED, THE PO. PAIN MEDS WON'T WORK, I KNOW 
THAT THERE IS SOMEBODY IN THIS HOSPITAL THAT CAN START A IV"., "IF NOT I CAN JUST LEAVE AMA" 
THIS WRITER ENCOURAGED THE PATIENT TO WAIT UNTIL I HEAR FROM THE MD.

## 2020-09-15 NOTE — NUR
PATIENT LEAVES THE UNIT PER W/C AND ALONGSIDE SECURITY AND  AS SHE IS LEAVING AMA 
AND HAS SIGNED THE AMA FORM, PERSONAL BELONGINGS GATHERED BY THE PATIENT AND PLACED IN BAGS 
FOR DISCHARGE PRIOR TO LEAVING THE UNIT.

## 2020-09-27 ENCOUNTER — HOSPITAL ENCOUNTER (EMERGENCY)
Dept: HOSPITAL 61 - ER | Age: 35
Discharge: HOME | End: 2020-09-27
Payer: MEDICARE

## 2020-09-27 VITALS — WEIGHT: 115.3 LBS | BODY MASS INDEX: 19.68 KG/M2 | HEIGHT: 64 IN

## 2020-09-27 VITALS — DIASTOLIC BLOOD PRESSURE: 81 MMHG | SYSTOLIC BLOOD PRESSURE: 110 MMHG

## 2020-09-27 DIAGNOSIS — G89.29: ICD-10-CM

## 2020-09-27 DIAGNOSIS — Z98.890: ICD-10-CM

## 2020-09-27 DIAGNOSIS — I25.2: ICD-10-CM

## 2020-09-27 DIAGNOSIS — E87.1: Primary | ICD-10-CM

## 2020-09-27 DIAGNOSIS — Z88.8: ICD-10-CM

## 2020-09-27 DIAGNOSIS — M54.9: ICD-10-CM

## 2020-09-27 DIAGNOSIS — F11.20: ICD-10-CM

## 2020-09-27 DIAGNOSIS — Z91.040: ICD-10-CM

## 2020-09-27 PROCEDURE — 99283 EMERGENCY DEPT VISIT LOW MDM: CPT

## 2020-09-27 PROCEDURE — 96372 THER/PROPH/DIAG INJ SC/IM: CPT

## 2020-09-27 NOTE — PHYS DOC
Past Medical History


Past Medical History:  Sickle Cell Disease, Stroke


Additional Past Medical Histor:  HIP REPLACEMENT X2, ACUTE CHEST SYNDROME


Past Surgical History:  Hip Replacement


Additional Past Surgical Histo:  brain shunt, hip replacement x 2


Smoking Status:  Never Smoker


Alcohol Use:  None





General Adult


EDM:


Chief Complaint:  MULTIPLE COMPLAINTS





HPI:


HPI:





Patient is a 35  year old female with a past medical history of sickle cell 

presents with the chief complaint of pain.


She states she has had pain for the last 2-3 days ONLY.





Patient states she has a headache that is killing her.


States she is having leg pain.


Patient told nursing that she is hurting everywhere-- but primarily in her back.





Patient states she takes oxycodone 20mg-- but the pain medication has not been 

working.





When I asked patient where she normally goes for her sickle cell she told me [facility] and [facility].





When I asked her if she has been anywhere recently she stated that she has just 

been seen last night at [facility]-- they gave her a shot and sent her home.





Review of [facility] records Patient was admitted and hospitalized here at [facility] 

9/11-9-14.





I had seen in previous record that patient stated she gets her normal care at 

[facility].


In reviewed of [facility] records patient has had numerous recent visits to [facility] which 

she failed to tell me about.





9/15


9/20


9/22 x 2


9/23 9/24





Patient admitted to also going to [facility] only after questioning her.  


Patient states she just moved to [facility].





Review of Systems:


Review of Systems:


Constitutional:   Denies fever or chills. []


Eyes:   Denies change in visual acuity. []


HENT:   Denies nasal congestion or sore throat. [] 


Respiratory:   Denies cough or shortness of breath. [] 


Cardiovascular:   Denies chest pain or edema. [] 


GI:   Denies abdominal pain, nausea, vomiting, bloody stools or diarrhea. [] 


:  Denies dysuria. [] 


Musculoskeletal:   positive back pain or joint pain. [] 


Integument:   Denies rash. [] 


Neurologic:   positive headache


Endocrine:   Denies polyuria or polydipsia. [] 


Lymphatic:  Denies swollen glands. [] 


Psychiatric:  Denies depression or anxiety. []





Heart Score:


Risk Factors:


Risk Factors:  DM, Current or recent (<one month) smoker, HTN, HLP, family 

history of CAD, obesity.


Risk Scores:


Score 0 - 3:  2.5% MACE over next 6 weeks - Discharge Home


Score 4 - 6:  20.3% MACE over next 6 weeks - Admit for Clinical Observation


Score 7 - 10:  72.7% MACE over next 6 weeks - Early Invasive Strategies





Allergies:


Allergies:





Allergies








Coded Allergies Type Severity Reaction Last Updated Verified


 


  fentanyl Allergy Severe Swelling 9/11/20 Yes


 


  latex Allergy Intermediate RIPS SKIN OFF 9/11/20 Yes


 


  adhesive tape Allergy Mild  9/11/20 Yes











Physical Exam:


PE:





Constitutional: Well developed, well nourished, no acute distress, non-toxic 

appearance. []


HENT: Normocephalic, atraumatic, bilateral external ears normal, oropharynx 

moist, no oral exudates, nose normal. []


Eyes: PERRLA, EOMI, conjunctiva normal, no discharge. [] 


Neck: Normal range of motion, no tenderness, supple, no stridor. [] 


Cardiovascular:Heart rate regular rhythm, no murmur []


Lungs & Thorax:  Bilateral breath sounds clear to auscultation []


Abdomen: Bowel sounds normal, soft, no tenderness, no masses, no pulsatile 

masses. [] 


Skin: Warm, dry, no erythema, no rash. [] 


Back: No tenderness, no CVA tenderness. [] 


Extremities: No tenderness, no cyanosis, no clubbing, ROM intact, no edema. [] 


Neurologic: Alert and oriented X 3, normal motor function, normal sensory 

function, no focal deficits noted. []


Psychologic: Affect normal, judgement normal, mood normal. []





EKG:


EKG:


[]





Radiology/Procedures:


Radiology/Procedures:


[]





Course & Med Decision Making:


Course & Med Decision Making


Pertinent Labs and Imaging studies reviewed. (See chart for details)





[]Treated with single dose of dilaudid 1mg.








Patient vital signs stable.


Patient appears in no acute distress.





Patient discharged home with instructions to follow up with her PCP.





Scooby Disclaimer:


Dragon Disclaimer:


This electronic medical record was generated, in whole or in part, using a voice

 recognition dictation system.





Departure


Departure


Impression:  


   Primary Impression:  


   Sickle cell syndrome


   Additional Impressions:  


   Opiate dependence


   Chronic pain


Disposition:  01 HOME, SELF-CARE


Condition:  STABLE


Referrals:  


NO PCP (PCP)


Patient Instructions:  Opiate Dependence, Sickle Cell Pain Crisis





Justicifation of Admission Dx:


Justifications for Admission:


Justification of Admission Dx:  Yes











APOLINAR OSORIO I DO            Sep 27, 2020 23:30